# Patient Record
Sex: FEMALE | Race: WHITE | NOT HISPANIC OR LATINO | Employment: FULL TIME | URBAN - METROPOLITAN AREA
[De-identification: names, ages, dates, MRNs, and addresses within clinical notes are randomized per-mention and may not be internally consistent; named-entity substitution may affect disease eponyms.]

---

## 2022-07-23 ENCOUNTER — OFFICE VISIT (OUTPATIENT)
Dept: URGENT CARE | Facility: CLINIC | Age: 27
End: 2022-07-23
Payer: COMMERCIAL

## 2022-07-23 VITALS
OXYGEN SATURATION: 100 % | RESPIRATION RATE: 16 BRPM | BODY MASS INDEX: 19.93 KG/M2 | DIASTOLIC BLOOD PRESSURE: 78 MMHG | HEIGHT: 67 IN | TEMPERATURE: 98 F | SYSTOLIC BLOOD PRESSURE: 136 MMHG | HEART RATE: 73 BPM | WEIGHT: 127 LBS

## 2022-07-23 DIAGNOSIS — R10.9 ABDOMINAL PAIN, UNSPECIFIED ABDOMINAL LOCATION: Primary | ICD-10-CM

## 2022-07-23 DIAGNOSIS — M25.511 ACUTE PAIN OF RIGHT SHOULDER: ICD-10-CM

## 2022-07-23 DIAGNOSIS — R19.7 DIARRHEA, UNSPECIFIED TYPE: ICD-10-CM

## 2022-07-23 DIAGNOSIS — T14.8XXA MUSCLE STRAIN: ICD-10-CM

## 2022-07-23 LAB
B-HCG UR QL: NEGATIVE
BILIRUB UR QL STRIP: NEGATIVE
CTP QC/QA: YES
GLUCOSE UR QL STRIP: NEGATIVE
KETONES UR QL STRIP: NEGATIVE
LEUKOCYTE ESTERASE UR QL STRIP: NEGATIVE
PH, POC UA: 5 (ref 5–8)
POC BLOOD, URINE: NEGATIVE
POC NITRATES, URINE: NEGATIVE
PROT UR QL STRIP: POSITIVE
SP GR UR STRIP: 1 (ref 1–1.03)
UROBILINOGEN UR STRIP-ACNC: NORMAL (ref 0.1–1.1)

## 2022-07-23 PROCEDURE — 81003 POCT URINALYSIS, DIPSTICK, AUTOMATED, W/O SCOPE: ICD-10-PCS | Mod: QW,S$GLB,, | Performed by: FAMILY MEDICINE

## 2022-07-23 PROCEDURE — 3078F PR MOST RECENT DIASTOLIC BLOOD PRESSURE < 80 MM HG: ICD-10-PCS | Mod: CPTII,S$GLB,, | Performed by: FAMILY MEDICINE

## 2022-07-23 PROCEDURE — 3008F BODY MASS INDEX DOCD: CPT | Mod: CPTII,S$GLB,, | Performed by: FAMILY MEDICINE

## 2022-07-23 PROCEDURE — 81025 URINE PREGNANCY TEST: CPT | Mod: S$GLB,,, | Performed by: FAMILY MEDICINE

## 2022-07-23 PROCEDURE — 3078F DIAST BP <80 MM HG: CPT | Mod: CPTII,S$GLB,, | Performed by: FAMILY MEDICINE

## 2022-07-23 PROCEDURE — 3008F PR BODY MASS INDEX (BMI) DOCUMENTED: ICD-10-PCS | Mod: CPTII,S$GLB,, | Performed by: FAMILY MEDICINE

## 2022-07-23 PROCEDURE — 3075F SYST BP GE 130 - 139MM HG: CPT | Mod: CPTII,S$GLB,, | Performed by: FAMILY MEDICINE

## 2022-07-23 PROCEDURE — 74019 RADEX ABDOMEN 2 VIEWS: CPT | Mod: FY,S$GLB,, | Performed by: INTERNAL MEDICINE

## 2022-07-23 PROCEDURE — 99214 OFFICE O/P EST MOD 30 MIN: CPT | Mod: S$GLB,,, | Performed by: FAMILY MEDICINE

## 2022-07-23 PROCEDURE — 3075F PR MOST RECENT SYSTOLIC BLOOD PRESS GE 130-139MM HG: ICD-10-PCS | Mod: CPTII,S$GLB,, | Performed by: FAMILY MEDICINE

## 2022-07-23 PROCEDURE — 81003 URINALYSIS AUTO W/O SCOPE: CPT | Mod: QW,S$GLB,, | Performed by: FAMILY MEDICINE

## 2022-07-23 PROCEDURE — 1159F PR MEDICATION LIST DOCUMENTED IN MEDICAL RECORD: ICD-10-PCS | Mod: CPTII,S$GLB,, | Performed by: FAMILY MEDICINE

## 2022-07-23 PROCEDURE — 74019 XR ABDOMEN FLAT AND ERECT: ICD-10-PCS | Mod: FY,S$GLB,, | Performed by: INTERNAL MEDICINE

## 2022-07-23 PROCEDURE — 81025 POCT URINE PREGNANCY: ICD-10-PCS | Mod: S$GLB,,, | Performed by: FAMILY MEDICINE

## 2022-07-23 PROCEDURE — 1160F RVW MEDS BY RX/DR IN RCRD: CPT | Mod: CPTII,S$GLB,, | Performed by: FAMILY MEDICINE

## 2022-07-23 PROCEDURE — 1159F MED LIST DOCD IN RCRD: CPT | Mod: CPTII,S$GLB,, | Performed by: FAMILY MEDICINE

## 2022-07-23 PROCEDURE — 99214 PR OFFICE/OUTPT VISIT, EST, LEVL IV, 30-39 MIN: ICD-10-PCS | Mod: S$GLB,,, | Performed by: FAMILY MEDICINE

## 2022-07-23 PROCEDURE — 1160F PR REVIEW ALL MEDS BY PRESCRIBER/CLIN PHARMACIST DOCUMENTED: ICD-10-PCS | Mod: CPTII,S$GLB,, | Performed by: FAMILY MEDICINE

## 2022-07-23 RX ORDER — DICYCLOMINE HYDROCHLORIDE 20 MG/1
20 TABLET ORAL EVERY 6 HOURS
Qty: 20 TABLET | Refills: 0 | Status: SHIPPED | OUTPATIENT
Start: 2022-07-23 | End: 2022-07-28

## 2022-07-23 NOTE — PROGRESS NOTES
"Subjective:       Patient ID: Magnolia Shen is a 27 y.o. female.    Vitals:  height is 5' 7" (1.702 m) and weight is 57.6 kg (127 lb). Her temperature is 98.3 °F (36.8 °C). Her blood pressure is 136/78 and her pulse is 73. Her respiration is 16 and oxygen saturation is 100%.     Chief Complaint: Abdominal Pain    Patient is experiencing pain in the abdomen, mostly on the right side under rib cage and around the right shoulder area.  She states that the pain started last night and has presisted. States the pain is mild to moderate, located to RUQ, no radiation. Also reports mild muscle strain around R shulder and R side of neck since yesterday.  Denies CP, SOB, N/V, fever, chills, cough. HA, body aches, dysuria.   Patient has taken tylenol for relief.     Abdominal Pain  This is a new problem. The current episode started yesterday. The onset quality is sudden. The problem occurs constantly. The pain is located in the RUQ. The pain is at a severity of 5/10. The pain is mild. The quality of the pain is sharp. The abdominal pain radiates to the right shoulder. Associated symptoms include diarrhea. The pain is aggravated by deep breathing. The pain is relieved by sitting up and being still. She has tried acetaminophen for the symptoms. The treatment provided no relief.       Gastrointestinal: Positive for abdominal pain and diarrhea.       Objective:      Physical Exam   Constitutional: She is oriented to person, place, and time. She appears well-developed. She is cooperative.  Non-toxic appearance. She does not appear ill. No distress. normal  HENT:   Head: Normocephalic and atraumatic.   Ears:   Right Ear: Hearing, tympanic membrane, external ear and ear canal normal. impacted cerumen  Left Ear: Hearing, tympanic membrane, external ear and ear canal normal. impacted cerumen  Nose: Nose normal. No mucosal edema, rhinorrhea, nasal deformity or congestion. No epistaxis. Right sinus exhibits no maxillary sinus tenderness and " no frontal sinus tenderness. Left sinus exhibits no maxillary sinus tenderness and no frontal sinus tenderness.   Mouth/Throat: Uvula is midline, oropharynx is clear and moist and mucous membranes are normal. No trismus in the jaw. Normal dentition. No uvula swelling.   Eyes: Conjunctivae and lids are normal. Right eye exhibits no discharge. Left eye exhibits no discharge. No scleral icterus.   Neck: Trachea normal and phonation normal. Neck supple.   Cardiovascular: Normal rate, regular rhythm, normal heart sounds and normal pulses.   No murmur heard.  Pulmonary/Chest: Effort normal and breath sounds normal. No stridor. No respiratory distress. She has no wheezes. She has no rhonchi. She has no rales. She exhibits no tenderness.   Abdominal: Normal appearance and bowel sounds are normal. She exhibits no distension and no mass. Soft. flat abdomen There is abdominal tenderness. There is no left CVA tenderness and no right CVA tenderness.      Comments: +v tenderness to RUQ. No umbilical or RLQ tenderness. BS +ve   Musculoskeletal: Normal range of motion.         General: No deformity. Normal range of motion.      Cervical back: She exhibits no tenderness.   Lymphadenopathy:     She has no cervical adenopathy.   Neurological: She is alert and oriented to person, place, and time. She exhibits normal muscle tone. Coordination normal.   Skin: Skin is warm, dry, intact, not diaphoretic and not pale.   Psychiatric: Her speech is normal and behavior is normal. Judgment and thought content normal.   Nursing note and vitals reviewed.        Assessment:       1. Abdominal pain, unspecified abdominal location    2. Diarrhea, unspecified type    3. Acute pain of right shoulder    4. Muscle strain          Plan:     UA and Abd Xray are unremarkable. Cindering that pt is having mild symptoms, I feel comfortable to DC pt home. Will f/i9 US report.  Discussed results/diagnosis/plan with patient in clinic. Advised to f/u with PCP  within 2-5 days. ER precautions given if symptoms get any worse. All questions answered. Patient verbally understood and agreed with treatment plan.    Abdominal pain, unspecified abdominal location  -     Cancel: POCT COVID-19 Rapid Screening  -     POCT Urinalysis, Dipstick, Automated, W/O Scope  -     POCT urine pregnancy  -     X-Ray Abdomen Flat And Erect; Future; Expected date: 07/23/2022  -     US Abdomen Complete; Future; Expected date: 07/23/2022  -     Ambulatory referral/consult to Family Practice    Diarrhea, unspecified type  -     Ambulatory referral/consult to Family Practice    Acute pain of right shoulder    Muscle strain    Other orders  -     dicyclomine (BENTYL) 20 mg tablet; Take 1 tablet (20 mg total) by mouth every 6 (six) hours. for 5 days  Dispense: 20 tablet; Refill: 0

## 2022-10-17 ENCOUNTER — LAB VISIT (OUTPATIENT)
Dept: LAB | Facility: HOSPITAL | Age: 27
End: 2022-10-17
Attending: INTERNAL MEDICINE
Payer: COMMERCIAL

## 2022-10-17 ENCOUNTER — OFFICE VISIT (OUTPATIENT)
Dept: INTERNAL MEDICINE | Facility: CLINIC | Age: 27
End: 2022-10-17
Payer: COMMERCIAL

## 2022-10-17 ENCOUNTER — TELEPHONE (OUTPATIENT)
Dept: INTERNAL MEDICINE | Facility: CLINIC | Age: 27
End: 2022-10-17

## 2022-10-17 VITALS
SYSTOLIC BLOOD PRESSURE: 118 MMHG | TEMPERATURE: 98 F | OXYGEN SATURATION: 99 % | HEART RATE: 71 BPM | HEIGHT: 67 IN | WEIGHT: 128.75 LBS | DIASTOLIC BLOOD PRESSURE: 66 MMHG | BODY MASS INDEX: 20.21 KG/M2

## 2022-10-17 DIAGNOSIS — Z11.59 ENCOUNTER FOR HEPATITIS C SCREENING TEST FOR LOW RISK PATIENT: ICD-10-CM

## 2022-10-17 DIAGNOSIS — Z00.00 WELLNESS EXAMINATION: ICD-10-CM

## 2022-10-17 DIAGNOSIS — Z00.00 WELLNESS EXAMINATION: Primary | ICD-10-CM

## 2022-10-17 LAB
ALBUMIN SERPL BCP-MCNC: 4 G/DL (ref 3.5–5.2)
ALP SERPL-CCNC: 95 U/L (ref 55–135)
ALT SERPL W/O P-5'-P-CCNC: 16 U/L (ref 10–44)
ANION GAP SERPL CALC-SCNC: 13 MMOL/L (ref 8–16)
AST SERPL-CCNC: 19 U/L (ref 10–40)
BILIRUB SERPL-MCNC: 0.4 MG/DL (ref 0.1–1)
BUN SERPL-MCNC: 18 MG/DL (ref 6–20)
CALCIUM SERPL-MCNC: 10.2 MG/DL (ref 8.7–10.5)
CHLORIDE SERPL-SCNC: 106 MMOL/L (ref 95–110)
CHOLEST SERPL-MCNC: 137 MG/DL (ref 120–199)
CHOLEST/HDLC SERPL: 3 {RATIO} (ref 2–5)
CO2 SERPL-SCNC: 20 MMOL/L (ref 23–29)
CREAT SERPL-MCNC: 0.8 MG/DL (ref 0.5–1.4)
ERYTHROCYTE [DISTWIDTH] IN BLOOD BY AUTOMATED COUNT: 12.3 % (ref 11.5–14.5)
EST. GFR  (NO RACE VARIABLE): >60 ML/MIN/1.73 M^2
GLUCOSE SERPL-MCNC: 88 MG/DL (ref 70–110)
HCT VFR BLD AUTO: 43.1 % (ref 37–48.5)
HCV AB SERPL QL IA: NORMAL
HDLC SERPL-MCNC: 45 MG/DL (ref 40–75)
HDLC SERPL: 32.8 % (ref 20–50)
HGB BLD-MCNC: 14.5 G/DL (ref 12–16)
LDLC SERPL CALC-MCNC: 78.6 MG/DL (ref 63–159)
MCH RBC QN AUTO: 28.5 PG (ref 27–31)
MCHC RBC AUTO-ENTMCNC: 33.6 G/DL (ref 32–36)
MCV RBC AUTO: 85 FL (ref 82–98)
NONHDLC SERPL-MCNC: 92 MG/DL
PLATELET # BLD AUTO: 279 K/UL (ref 150–450)
PMV BLD AUTO: 10 FL (ref 9.2–12.9)
POTASSIUM SERPL-SCNC: 5.1 MMOL/L (ref 3.5–5.1)
PROT SERPL-MCNC: 7.5 G/DL (ref 6–8.4)
RBC # BLD AUTO: 5.09 M/UL (ref 4–5.4)
SODIUM SERPL-SCNC: 139 MMOL/L (ref 136–145)
T4 FREE SERPL-MCNC: 1.57 NG/DL (ref 0.71–1.51)
TRIGL SERPL-MCNC: 67 MG/DL (ref 30–150)
TSH SERPL DL<=0.005 MIU/L-ACNC: <0.01 UIU/ML (ref 0.4–4)
WBC # BLD AUTO: 6.38 K/UL (ref 3.9–12.7)

## 2022-10-17 PROCEDURE — 1160F PR REVIEW ALL MEDS BY PRESCRIBER/CLIN PHARMACIST DOCUMENTED: ICD-10-PCS | Mod: CPTII,S$GLB,, | Performed by: INTERNAL MEDICINE

## 2022-10-17 PROCEDURE — 3078F PR MOST RECENT DIASTOLIC BLOOD PRESSURE < 80 MM HG: ICD-10-PCS | Mod: CPTII,S$GLB,, | Performed by: INTERNAL MEDICINE

## 2022-10-17 PROCEDURE — 1159F PR MEDICATION LIST DOCUMENTED IN MEDICAL RECORD: ICD-10-PCS | Mod: CPTII,S$GLB,, | Performed by: INTERNAL MEDICINE

## 2022-10-17 PROCEDURE — 84439 ASSAY OF FREE THYROXINE: CPT | Performed by: INTERNAL MEDICINE

## 2022-10-17 PROCEDURE — 99385 PR PREVENTIVE VISIT,NEW,18-39: ICD-10-PCS | Mod: S$GLB,,, | Performed by: INTERNAL MEDICINE

## 2022-10-17 PROCEDURE — 99999 PR PBB SHADOW E&M-EST. PATIENT-LVL IV: CPT | Mod: PBBFAC,,, | Performed by: INTERNAL MEDICINE

## 2022-10-17 PROCEDURE — 3078F DIAST BP <80 MM HG: CPT | Mod: CPTII,S$GLB,, | Performed by: INTERNAL MEDICINE

## 2022-10-17 PROCEDURE — 1160F RVW MEDS BY RX/DR IN RCRD: CPT | Mod: CPTII,S$GLB,, | Performed by: INTERNAL MEDICINE

## 2022-10-17 PROCEDURE — 99385 PREV VISIT NEW AGE 18-39: CPT | Mod: S$GLB,,, | Performed by: INTERNAL MEDICINE

## 2022-10-17 PROCEDURE — 3074F SYST BP LT 130 MM HG: CPT | Mod: CPTII,S$GLB,, | Performed by: INTERNAL MEDICINE

## 2022-10-17 PROCEDURE — 86803 HEPATITIS C AB TEST: CPT | Performed by: INTERNAL MEDICINE

## 2022-10-17 PROCEDURE — 1159F MED LIST DOCD IN RCRD: CPT | Mod: CPTII,S$GLB,, | Performed by: INTERNAL MEDICINE

## 2022-10-17 PROCEDURE — 36415 COLL VENOUS BLD VENIPUNCTURE: CPT | Performed by: INTERNAL MEDICINE

## 2022-10-17 PROCEDURE — 80053 COMPREHEN METABOLIC PANEL: CPT | Performed by: INTERNAL MEDICINE

## 2022-10-17 PROCEDURE — 99999 PR PBB SHADOW E&M-EST. PATIENT-LVL IV: ICD-10-PCS | Mod: PBBFAC,,, | Performed by: INTERNAL MEDICINE

## 2022-10-17 PROCEDURE — 85027 COMPLETE CBC AUTOMATED: CPT | Performed by: INTERNAL MEDICINE

## 2022-10-17 PROCEDURE — 84443 ASSAY THYROID STIM HORMONE: CPT | Performed by: INTERNAL MEDICINE

## 2022-10-17 PROCEDURE — 3074F PR MOST RECENT SYSTOLIC BLOOD PRESSURE < 130 MM HG: ICD-10-PCS | Mod: CPTII,S$GLB,, | Performed by: INTERNAL MEDICINE

## 2022-10-17 PROCEDURE — 80061 LIPID PANEL: CPT | Performed by: INTERNAL MEDICINE

## 2022-10-17 NOTE — TELEPHONE ENCOUNTER
----- Message from Renee Bal MD sent at 10/17/2022  9:50 AM CDT -----  Reina Burch at [facility] in [facility]

## 2022-10-17 NOTE — PROGRESS NOTES
[facility] Note    Chief Complaint    No chief complaint on file.    History of Present Illness      Magnolia Shen is a 27 y.o. female who presents today for chief complaint annual wellness and est care .     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  No significant fam medical hx, no surgeries, no allergies     Mood is ok, sleep is ok   Watches diet, she exercises       There are no problems to display for this patient.      Health Maintenance   Topic Date Due    Hepatitis C Screening  Never done    Lipid Panel  Never done    TETANUS VACCINE  Never done    Pap Smear  Never done       History reviewed. No pertinent past medical history.    History reviewed. No pertinent surgical history.    family history is not on file.    Social History     Tobacco Use    Smoking status: Never    Smokeless tobacco: Never   Substance Use Topics    Alcohol use: Yes     Alcohol/week: 1.0 standard drink     Types: 1 Glasses of wine per week    Drug use: Never       Review of Systems   Constitutional:  Negative for chills and fever.   HENT:  Negative for congestion and sore throat.    Eyes:  Negative for blurred vision and discharge.   Respiratory:  Negative for cough and shortness of breath.    Cardiovascular:  Negative for chest pain and palpitations.   Gastrointestinal:  Negative for constipation, diarrhea, nausea and vomiting.   Genitourinary:  Negative for dysuria and hematuria.   Musculoskeletal:  Negative  "for falls and myalgias.   Skin:  Negative for itching and rash.   Neurological:  Negative for dizziness and headaches.      Outpatient Encounter Medications as of 10/17/2022   Medication Sig Dispense Refill    norethindrone-e.estradioL-iron (LO LOESTRIN FE) 1 mg-10 mcg (24)/10 mcg (2) Tab Take 1 tablet by mouth daily 84 tablet 3     No facility-administered encounter medications on file as of 10/17/2022.        Review of patient's allergies indicates:  No Known Allergies        Physical Exam      Vital Signs  Temp: 98.1 °F (36.7 °C)  Pulse: 71  SpO2: 99 %  BP: 118/66  BP Location: Right arm  Patient Position: Sitting  Height and Weight  Height: 5' 7" (170.2 cm)  Weight: 58.4 kg (128 lb 12 oz)  BSA (Calculated - sq m): 1.66 sq meters  BMI (Calculated): 20.2  Weight in (lb) to have BMI = 25: 159.3]    Physical Exam  Vitals reviewed.   Constitutional:       Appearance: She is well-developed.   HENT:      Head: Normocephalic and atraumatic.      Right Ear: External ear normal.      Left Ear: External ear normal.   Eyes:      General:         Right eye: No discharge.         Left eye: No discharge.   Neck:      Thyroid: No thyromegaly.   Cardiovascular:      Rate and Rhythm: Normal rate and regular rhythm.      Heart sounds: Normal heart sounds. No murmur heard.  Pulmonary:      Effort: Pulmonary effort is normal. No respiratory distress.      Breath sounds: Normal breath sounds.   Abdominal:      General: Bowel sounds are normal. There is no distension.      Palpations: Abdomen is soft.      Tenderness: There is no abdominal tenderness.   Musculoskeletal:         General: No deformity. Normal range of motion.      Cervical back: Normal range of motion.   Skin:     General: Skin is warm and dry.      Findings: No rash.   Neurological:      Mental Status: She is alert and oriented to person, place, and time.   Psychiatric:         Behavior: Behavior normal.        Laboratory:  CBC:  No results for input(s): WBC, RBC, HGB, " HCT, PLT, MCV, MCH, MCHC in the last 2160 hours.  CMP:  No results for input(s): GLU, CALCIUM, ALBUMIN, PROT, NA, K, CO2, CL, BUN, ALKPHOS, ALT, AST, BILITOT in the last 2160 hours.    Invalid input(s): CREATININ  URINALYSIS:  Recent Labs   Lab Result Units 07/23/22  1033   pH, UA  5.0      LIPIDS:  No results for input(s): TSH, HDL, CHOL, TRIG, LDLCALC, CHOLHDL, NONHDLCHOL, TOTALCHOLEST in the last 2160 hours.  TSH:  No results for input(s): TSH in the last 2160 hours.  A1C:  No results for input(s): HGBA1C in the last 2160 hours.        Assessment/Plan     Magnolia Shen is a 27 y.o.female with:    1. Wellness examination  -     Ambulatory referral/consult to Obstetrics / Gynecology; Future; Expected date: 10/24/2022  -     Ambulatory referral/consult to Dermatology; Future; Expected date: 10/24/2022  -     Lipid Panel; Future; Expected date: 10/17/2022  -     Comprehensive Metabolic Panel; Future; Expected date: 10/17/2022  -     T4, Free; Future; Expected date: 10/17/2022  -     TSH; Future; Expected date: 10/17/2022  -     CBC Without Differential; Future; Expected date: 10/17/2022    2. Encounter for hepatitis C screening test for low risk patient  -     Hepatitis C Antibody; Future; Expected date: 10/17/2022       Use of the [facility] Patient Portal discussed and encouraged during today's visit  -Continue current medications and maintain follow up with specialists.  Return to clinic in 12 months .  No future appointments.    Renee Bal MD  10/17/2022 9:44 AM    Primary Care Internal Medicine

## 2022-10-17 NOTE — PATIENT INSTRUCTIONS
[email or web address]  [phone]  Dr Waller or [facility] on [address]  Dr Stacey Barakat [phone]

## 2022-10-17 NOTE — LETTER
AUTHORIZATION FOR RELEASE OF   CONFIDENTIAL INFORMATION    Dear Dr. Burch,    We are seeing Magnolia Shen, date of birth 1995, in the clinic at [facility]. Renee Bal MD is the patient's PCP. Magnolia Shen has an outstanding lab/procedure at the time we reviewed her chart. In order to help keep her health information updated, she has authorized us to request the following medical record(s):        (  )  MAMMOGRAM                                      (  )  COLONOSCOPY      (X)  PAP SMEAR                                          (  )  OUTSIDE LAB RESULTS     (  )  DEXA SCAN                                          (  )  EYE EXAM            (  )  FOOT EXAM                                          (  )  ENTIRE RECORD     (  )  OUTSIDE IMMUNIZATIONS                 (  )  _______________         Please fax records to [facility], Abby E. Gandolfi, MD, [phone]     If you have any questions, please contact Katherine at [phone].           Patient Name: Magnolia Shen  : 1995  Patient Phone #: [phone]

## 2022-10-23 DIAGNOSIS — E05.90 HYPERTHYROIDISM: Primary | ICD-10-CM

## 2022-11-01 ENCOUNTER — PATIENT OUTREACH (OUTPATIENT)
Dept: ADMINISTRATIVE | Facility: HOSPITAL | Age: 27
End: 2022-11-01
Payer: COMMERCIAL

## 2022-11-01 NOTE — PROGRESS NOTES
Health Maintenance Due   Topic Date Due    COVID-19 Vaccine (1) Never done    HIV Screening  Never done    TETANUS VACCINE  Never done    Pap Smear  Never done    Influenza Vaccine (1) Never done     Chart review done. HM updated. Immunizations reviewed & updated. Care Everywhere updated.  OB/GYN notes from 2021 scanned into .

## 2022-11-15 ENCOUNTER — LAB VISIT (OUTPATIENT)
Dept: LAB | Facility: HOSPITAL | Age: 27
End: 2022-11-15
Attending: INTERNAL MEDICINE
Payer: COMMERCIAL

## 2022-11-15 DIAGNOSIS — E05.90 HYPERTHYROIDISM: ICD-10-CM

## 2022-11-15 LAB
T3 SERPL-MCNC: 239 NG/DL (ref 60–180)
T4 FREE SERPL-MCNC: 1.67 NG/DL (ref 0.71–1.51)
TSH SERPL DL<=0.005 MIU/L-ACNC: <0.01 UIU/ML (ref 0.4–4)

## 2022-11-15 PROCEDURE — 84480 ASSAY TRIIODOTHYRONINE (T3): CPT | Performed by: INTERNAL MEDICINE

## 2022-11-15 PROCEDURE — 84439 ASSAY OF FREE THYROXINE: CPT | Performed by: INTERNAL MEDICINE

## 2022-11-15 PROCEDURE — 36415 COLL VENOUS BLD VENIPUNCTURE: CPT | Mod: PO | Performed by: INTERNAL MEDICINE

## 2022-11-15 PROCEDURE — 84443 ASSAY THYROID STIM HORMONE: CPT | Performed by: INTERNAL MEDICINE

## 2022-11-17 ENCOUNTER — OFFICE VISIT (OUTPATIENT)
Dept: INTERNAL MEDICINE | Facility: CLINIC | Age: 27
End: 2022-11-17
Payer: COMMERCIAL

## 2022-11-17 VITALS
SYSTOLIC BLOOD PRESSURE: 122 MMHG | WEIGHT: 127.88 LBS | DIASTOLIC BLOOD PRESSURE: 68 MMHG | HEIGHT: 67 IN | BODY MASS INDEX: 20.07 KG/M2 | HEART RATE: 91 BPM | TEMPERATURE: 98 F | OXYGEN SATURATION: 98 %

## 2022-11-17 DIAGNOSIS — B34.1 COXSACKIE VIRUSES: ICD-10-CM

## 2022-11-17 DIAGNOSIS — E05.90 HYPERTHYROIDISM: Primary | ICD-10-CM

## 2022-11-17 PROCEDURE — 99214 PR OFFICE/OUTPT VISIT, EST, LEVL IV, 30-39 MIN: ICD-10-PCS | Mod: S$GLB,,, | Performed by: INTERNAL MEDICINE

## 2022-11-17 PROCEDURE — 3078F PR MOST RECENT DIASTOLIC BLOOD PRESSURE < 80 MM HG: ICD-10-PCS | Mod: CPTII,S$GLB,, | Performed by: INTERNAL MEDICINE

## 2022-11-17 PROCEDURE — 1159F PR MEDICATION LIST DOCUMENTED IN MEDICAL RECORD: ICD-10-PCS | Mod: CPTII,S$GLB,, | Performed by: INTERNAL MEDICINE

## 2022-11-17 PROCEDURE — 99999 PR PBB SHADOW E&M-EST. PATIENT-LVL III: CPT | Mod: PBBFAC,,, | Performed by: INTERNAL MEDICINE

## 2022-11-17 PROCEDURE — 3074F PR MOST RECENT SYSTOLIC BLOOD PRESSURE < 130 MM HG: ICD-10-PCS | Mod: CPTII,S$GLB,, | Performed by: INTERNAL MEDICINE

## 2022-11-17 PROCEDURE — 1159F MED LIST DOCD IN RCRD: CPT | Mod: CPTII,S$GLB,, | Performed by: INTERNAL MEDICINE

## 2022-11-17 PROCEDURE — 3008F PR BODY MASS INDEX (BMI) DOCUMENTED: ICD-10-PCS | Mod: CPTII,S$GLB,, | Performed by: INTERNAL MEDICINE

## 2022-11-17 PROCEDURE — 3074F SYST BP LT 130 MM HG: CPT | Mod: CPTII,S$GLB,, | Performed by: INTERNAL MEDICINE

## 2022-11-17 PROCEDURE — 99999 PR PBB SHADOW E&M-EST. PATIENT-LVL III: ICD-10-PCS | Mod: PBBFAC,,, | Performed by: INTERNAL MEDICINE

## 2022-11-17 PROCEDURE — 99214 OFFICE O/P EST MOD 30 MIN: CPT | Mod: S$GLB,,, | Performed by: INTERNAL MEDICINE

## 2022-11-17 PROCEDURE — 3078F DIAST BP <80 MM HG: CPT | Mod: CPTII,S$GLB,, | Performed by: INTERNAL MEDICINE

## 2022-11-17 PROCEDURE — 3008F BODY MASS INDEX DOCD: CPT | Mod: CPTII,S$GLB,, | Performed by: INTERNAL MEDICINE

## 2022-11-17 NOTE — PROGRESS NOTES
[facility]    Chief Complaint      Chief Complaint   Patient presents with    Follow-up     Discuss labs    Thyroid Problem    Mouth Lesions     Kids had HFM about a month ago and she developed a bunch ulcers that went ended up going away but now they have returned      History of Present Illness      Magnolia Shen is a 27 y.o. female who presents today for chief complaint hyperthyroid       HPI   Follow up labs - TSH <0.01 x2 and T4 1.6  Recalls that she was hyperthyroid pre conception(prior to Dec 2020), was placed on clomid and apparently not rechecked,  unsure what first timester TSH was. Twins born 8.2021  Euthyroid clinically, denies palpitations, hair loss, dry skin, diarrhea, fatigue  Has fam hx of hypothyroidism       Kids had HFM she has oral ulcers   There are no problems to display for this patient.      Health Maintenance   Topic Date Due    TETANUS VACCINE  Never done    Pap Smear  Never done    Hepatitis C Screening  Completed    Lipid Panel  Completed       History reviewed. No pertinent past medical history.    History reviewed. No pertinent surgical history.    family history is not on file.    Social History     Tobacco Use    Smoking status: Never    Smokeless tobacco: Never   Substance Use Topics    Alcohol use: Yes     Alcohol/week: 1.0 standard drink     Types: 1 Glasses of wine per week    Drug use: Never       Review of Systems   Constitutional:  Negative for chills, fever, malaise/fatigue and weight loss.   Respiratory:  Negative for cough, sputum production, shortness of breath and wheezing.    Cardiovascular:  Negative for chest pain, palpitations, orthopnea and leg swelling.   Gastrointestinal:  Negative for constipation, diarrhea, nausea and vomiting.   Genitourinary:  Negative for dysuria, frequency, hematuria and urgency.      Outpatient Encounter Medications as of 11/17/2022   Medication Sig Note Dispense Refill    norethindrone-e.estradioL-iron (LO  "LOESTRIN FE) 1 mg-10 mcg (24)/10 mcg (2) Tab Take 1 tablet by mouth daily  84 tablet 3    [DISCONTINUED] diphenhydrAMINE-aluminum-magnesium hydroxide-simethicone-LIDOcaine HCl 2% Swish and spit 15 mLs every 4 (four) hours as needed (mouth pain). 11/17/2022: refer to rx [id number] 200 each 0     No facility-administered encounter medications on file as of 11/17/2022.        Review of patient's allergies indicates:  No Known Allergies        Physical Exam      Vital Signs  Temp: 97.7 °F (36.5 °C)  Pulse: 91  SpO2: 98 %  BP: 122/68  BP Location: Right arm  Patient Position: Sitting  Height and Weight  Height: 5' 7" (170.2 cm)  Weight: 58 kg (127 lb 13.9 oz)  BSA (Calculated - sq m): 1.66 sq meters  BMI (Calculated): 20  Weight in (lb) to have BMI = 25: 159.3]    Physical Exam  Vitals reviewed.   Constitutional:       General: She is not in acute distress.     Appearance: She is well-developed. She is not diaphoretic.   HENT:      Head: Normocephalic and atraumatic.      Right Ear: External ear normal.      Left Ear: External ear normal.      Nose: Nose normal.   Eyes:      General:         Right eye: No discharge.         Left eye: No discharge.      Conjunctiva/sclera: Conjunctivae normal.   Pulmonary:      Effort: Pulmonary effort is normal. No respiratory distress.   Musculoskeletal:         General: Normal range of motion.      Cervical back: Normal range of motion.   Skin:     Coloration: Skin is not pale.      Findings: No rash.   Neurological:      Mental Status: She is alert and oriented to person, place, and time.   Psychiatric:         Behavior: Behavior normal.         Thought Content: Thought content normal.        Laboratory:  CBC:  Recent Labs   Lab Result Units 10/17/22  1015   WBC K/uL 6.38   RBC M/uL 5.09   Hemoglobin g/dL 14.5   Hematocrit % 43.1   Platelets K/uL 279   MCV fL 85   MCH pg 28.5   MCHC g/dL 33.6     CMP:  Recent Labs   Lab Result Units 10/17/22  1015   Glucose mg/dL 88   Calcium mg/dL 10.2 "   Albumin g/dL 4.0   Total Protein g/dL 7.5   Sodium mmol/L 139   Potassium mmol/L 5.1   CO2 mmol/L 20*   Chloride mmol/L 106   BUN mg/dL 18   Alkaline Phosphatase U/L 95   ALT U/L 16   AST U/L 19   Total Bilirubin mg/dL 0.4     URINALYSIS:  No results for input(s): COLORU, CLARITYU, SPECGRAV, PHUR, PROTEINUA, GLUCOSEU, BILIRUBINCON, BLOODU, WBCU, RBCU, BACTERIA, MUCUS, NITRITE, LEUKOCYTESUR, UROBILINOGEN, HYALINECASTS in the last 2160 hours.   LIPIDS:  Recent Labs   Lab Result Units 10/17/22  1015 11/15/22  0715   TSH uIU/mL <0.010* <0.010*   HDL mg/dL 45  --    Cholesterol mg/dL 137  --    Triglycerides mg/dL 67  --    LDL Cholesterol mg/dL 78.6  --    HDL/Cholesterol Ratio % 32.8  --    Non-HDL Cholesterol mg/dL 92  --    Total Cholesterol/HDL Ratio  3.0  --      TSH:  Recent Labs   Lab Result Units 10/17/22  1015 11/15/22  0715   TSH uIU/mL <0.010* <0.010*     A1C:  No results for input(s): HGBA1C in the last 2160 hours.        Assessment/Plan     Magnolia Shen is a 27 y.o.female with:    1. Hyperthyroidism  Initiate work up and will do econsult to discuss MMI with Endo  Discussed teratogenicity     -     TSH; Future; Expected date: 11/17/2022  -     T3; Future; Expected date: 11/17/2022  -     US Soft Tissue Head Neck Thyroid; Future; Expected date: 11/17/2022  -     THYROID STIMULATING IMMUNOGLOBULIN; Future; Expected date: 11/17/2022  -     THYROTROPIN RECEPTOR ANTIBODY; Future; Expected date: 11/17/2022  -     T4, FREE; Future; Expected date: 11/17/2022    2. Coxsackie viruses  -     Discontinue: diphenhydrAMINE-aluminum-magnesium hydroxide-simethicone-LIDOcaine HCl 2%; Swish and spit 15 mLs every 4 (four) hours as needed (mouth pain).  Dispense: 200 each; Refill: 0         Future Appointments   Date Time Provider Department Center   12/1/2022 10:00 AM [facility] [place]   1/31/2023  2:10 PM Emily Yarbrough MD [facility] [place]       Renee Bal MD  11/17/2022 8:54  AM    Primary Care Internal Medicine

## 2022-12-01 ENCOUNTER — OFFICE VISIT (OUTPATIENT)
Dept: OBSTETRICS AND GYNECOLOGY | Facility: CLINIC | Age: 27
End: 2022-12-01
Payer: COMMERCIAL

## 2022-12-01 ENCOUNTER — HOSPITAL ENCOUNTER (OUTPATIENT)
Dept: RADIOLOGY | Facility: HOSPITAL | Age: 27
Discharge: HOME | End: 2022-12-01
Attending: INTERNAL MEDICINE
Payer: COMMERCIAL

## 2022-12-01 VITALS
HEIGHT: 67 IN | DIASTOLIC BLOOD PRESSURE: 72 MMHG | BODY MASS INDEX: 19.82 KG/M2 | WEIGHT: 126.31 LBS | SYSTOLIC BLOOD PRESSURE: 118 MMHG

## 2022-12-01 DIAGNOSIS — Z00.00 WELLNESS EXAMINATION: ICD-10-CM

## 2022-12-01 DIAGNOSIS — E05.90 HYPERTHYROIDISM: ICD-10-CM

## 2022-12-01 DIAGNOSIS — Z01.419 WOMEN'S ANNUAL ROUTINE GYNECOLOGICAL EXAMINATION: Primary | ICD-10-CM

## 2022-12-01 DIAGNOSIS — Z12.4 ENCOUNTER FOR PAPANICOLAOU SMEAR FOR CERVICAL CANCER SCREENING: ICD-10-CM

## 2022-12-01 PROCEDURE — 76536 US EXAM OF HEAD AND NECK: CPT | Mod: TC

## 2022-12-01 PROCEDURE — 76536 US EXAM OF HEAD AND NECK: CPT | Mod: 26,,, | Performed by: RADIOLOGY

## 2022-12-01 PROCEDURE — 99385 PR PREVENTIVE VISIT,NEW,18-39: ICD-10-PCS | Mod: S$GLB,,, | Performed by: NURSE PRACTITIONER

## 2022-12-01 PROCEDURE — 99385 PREV VISIT NEW AGE 18-39: CPT | Mod: S$GLB,,, | Performed by: NURSE PRACTITIONER

## 2022-12-01 PROCEDURE — 3008F BODY MASS INDEX DOCD: CPT | Mod: CPTII,S$GLB,, | Performed by: NURSE PRACTITIONER

## 2022-12-01 PROCEDURE — 99999 PR PBB SHADOW E&M-EST. PATIENT-LVL III: CPT | Mod: PBBFAC,,, | Performed by: NURSE PRACTITIONER

## 2022-12-01 PROCEDURE — 3008F PR BODY MASS INDEX (BMI) DOCUMENTED: ICD-10-PCS | Mod: CPTII,S$GLB,, | Performed by: NURSE PRACTITIONER

## 2022-12-01 PROCEDURE — 3074F SYST BP LT 130 MM HG: CPT | Mod: CPTII,S$GLB,, | Performed by: NURSE PRACTITIONER

## 2022-12-01 PROCEDURE — 3078F DIAST BP <80 MM HG: CPT | Mod: CPTII,S$GLB,, | Performed by: NURSE PRACTITIONER

## 2022-12-01 PROCEDURE — 76536 US SOFT TISSUE HEAD NECK THYROID: ICD-10-PCS | Mod: 26,,, | Performed by: RADIOLOGY

## 2022-12-01 PROCEDURE — 88175 CYTOPATH C/V AUTO FLUID REDO: CPT | Performed by: NURSE PRACTITIONER

## 2022-12-01 PROCEDURE — 3078F PR MOST RECENT DIASTOLIC BLOOD PRESSURE < 80 MM HG: ICD-10-PCS | Mod: CPTII,S$GLB,, | Performed by: NURSE PRACTITIONER

## 2022-12-01 PROCEDURE — 3074F PR MOST RECENT SYSTOLIC BLOOD PRESSURE < 130 MM HG: ICD-10-PCS | Mod: CPTII,S$GLB,, | Performed by: NURSE PRACTITIONER

## 2022-12-01 PROCEDURE — 99999 PR PBB SHADOW E&M-EST. PATIENT-LVL III: ICD-10-PCS | Mod: PBBFAC,,, | Performed by: NURSE PRACTITIONER

## 2022-12-01 NOTE — PROGRESS NOTES
CC: Annual  HPI: Pt is a 27 y.o.  female who presents for routine annual exam. She is new to [facility] from [place], [place]. 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 She has 2 yo girl/ boy twins.  She is not currently using.  She stopped due to recent labs revealing hyperthyroidism. She has upcoming thyroid US scheduled. She is hesitant to start meds in fear of weight gain. She does have symptoms of heart palpitations and sweating.  She does not want STD screening.  Denies any GYN complaints.  The patient participates in regular exercise: yes.  The patient does not smoke.  The patient wears seatbelts.   Pt denies any domestic violence. She is s/p the HPV vaccine series.        FH:  Breast cancer: none  Colon cancer: none  Ovarian cancer: none  Endometrial cancer: none    ROS:  GENERAL: Feeling well overall. Denies fever or chills.   SKIN: Denies rash or lesions.   HEAD: Denies head injury or headache.   NODES: Denies enlarged lymph nodes.   CHEST: Denies chest pain or shortness of breath.   CARDIOVASCULAR: Denies palpitations or left sided chest pain.   ABDOMEN: No abdominal pain, constipation, diarrhea, nausea, vomiting or rectal bleeding.   URINARY: No dysuria, hematuria, or burning on urination.  REPRODUCTIVE: See HPI.   BREASTS: Denies pain, lumps, or nipple discharge.   HEMATOLOGIC: No easy bruisability or excessive bleeding.   MUSCULOSKELETAL: Denies joint pain or swelling.   NEUROLOGIC: Denies syncope or weakness.   PSYCHIATRIC: Denies depression, anxiety or mood swings.    PE:   APPEARANCE: Well nourished, well developed, White female in no acute distress.  NODES: no cervical, supraclavicular, or inguinal lymphadenopathy  BREASTS: Symmetrical, no skin changes or visible lesions. No palpable masses, nipple discharge or adenopathy bilaterally.  ABDOMEN: Soft. No tenderness or masses. No distention. No hernias palpated. No CVA tenderness.  VULVA: No lesions. Normal external  female genitalia.  URETHRAL MEATUS: Normal size and location, no lesions, no prolapse.  URETHRA: No masses, tenderness, or prolapse.  VAGINA: Moist. No lesions or lacerations noted. No abnormal discharge present. No odor present.   CERVIX: No lesions or discharge. No cervical motion tenderness.   UTERUS: Normal size, regular shape, mobile, non-tender.  ADNEXA: No tenderness. No fullness or masses palpated in the adnexal regions.   ANUS PERINEUM: Normal.      Diagnosis:  1. Women's annual routine gynecological examination    2. Wellness examination    3. Encounter for Papanicolaou smear for cervical cancer screening    4. Hyperthyroidism        Plan:   Pap  Referral to endocrinology to discuss med management with hyperthyroidism         Orders Placed This Encounter    Ambulatory referral/consult to Endocrinology    Liquid-Based Pap Smear, Screening       Patient was counseled today on the new ACS guidelines for cervical cytology screening as well as the current recommendations for breast cancer screening. She was counseled to follow up with her PCP for other routine health maintenance. Counseling session lasted approximately 10 minutes, and all her questions were answered.    Follow-up with me in 1 year for routine exam      JOYCE Turner

## 2022-12-05 ENCOUNTER — PATIENT MESSAGE (OUTPATIENT)
Dept: INTERNAL MEDICINE | Facility: CLINIC | Age: 27
End: 2022-12-05
Payer: COMMERCIAL

## 2022-12-06 ENCOUNTER — PATIENT MESSAGE (OUTPATIENT)
Dept: INTERNAL MEDICINE | Facility: CLINIC | Age: 27
End: 2022-12-06
Payer: COMMERCIAL

## 2022-12-08 LAB
FINAL PATHOLOGIC DIAGNOSIS: NORMAL
Lab: NORMAL

## 2022-12-09 DIAGNOSIS — E05.90 HYPERTHYROIDISM: Primary | ICD-10-CM

## 2022-12-09 RX ORDER — METHIMAZOLE 5 MG/1
5 TABLET ORAL 3 TIMES DAILY
Qty: 30 TABLET | Refills: 2 | Status: SHIPPED | OUTPATIENT
Start: 2022-12-09 | End: 2022-12-09 | Stop reason: SDUPTHER

## 2022-12-09 RX ORDER — METHIMAZOLE 5 MG/1
5 TABLET ORAL DAILY
Qty: 30 TABLET | Refills: 2 | Status: SHIPPED | OUTPATIENT
Start: 2022-12-09 | End: 2023-04-26 | Stop reason: SDUPTHER

## 2022-12-09 NOTE — PROGRESS NOTES
US consistent with thyroiditis, as suspected by her 11/2022 labs.  Since she can't get in with endo until April, starting her on methimazole 5 mg daily, will need repeat thyroid labs in 4 weeks to see how this is working, please schedule

## 2023-01-09 ENCOUNTER — LAB VISIT (OUTPATIENT)
Dept: LAB | Facility: HOSPITAL | Age: 28
End: 2023-01-09
Payer: COMMERCIAL

## 2023-01-09 DIAGNOSIS — E05.90 HYPERTHYROIDISM: ICD-10-CM

## 2023-01-09 LAB
T3 SERPL-MCNC: 221 NG/DL (ref 60–180)
T4 FREE SERPL-MCNC: 1.72 NG/DL (ref 0.71–1.51)
TSH SERPL DL<=0.005 MIU/L-ACNC: <0.01 UIU/ML (ref 0.4–4)

## 2023-01-09 PROCEDURE — 84439 ASSAY OF FREE THYROXINE: CPT | Performed by: INTERNAL MEDICINE

## 2023-01-09 PROCEDURE — 83520 IMMUNOASSAY QUANT NOS NONAB: CPT | Performed by: INTERNAL MEDICINE

## 2023-01-09 PROCEDURE — 84480 ASSAY TRIIODOTHYRONINE (T3): CPT | Performed by: INTERNAL MEDICINE

## 2023-01-09 PROCEDURE — 84445 ASSAY OF TSI GLOBULIN: CPT | Performed by: INTERNAL MEDICINE

## 2023-01-09 PROCEDURE — 84443 ASSAY THYROID STIM HORMONE: CPT | Performed by: INTERNAL MEDICINE

## 2023-01-10 LAB — TSH RECEP AB SER-ACNC: 34 IU/L (ref 0–1.75)

## 2023-01-12 LAB — TSI SER-ACNC: 25.1 IU/L

## 2023-01-23 ENCOUNTER — PATIENT MESSAGE (OUTPATIENT)
Dept: INTERNAL MEDICINE | Facility: CLINIC | Age: 28
End: 2023-01-23
Payer: COMMERCIAL

## 2023-01-23 DIAGNOSIS — E05.90 HYPERTHYROIDISM: Primary | ICD-10-CM

## 2023-02-13 ENCOUNTER — LAB VISIT (OUTPATIENT)
Dept: LAB | Facility: HOSPITAL | Age: 28
End: 2023-02-13
Payer: COMMERCIAL

## 2023-02-13 DIAGNOSIS — E05.90 HYPERTHYROIDISM: ICD-10-CM

## 2023-02-13 LAB
T3 SERPL-MCNC: 103 NG/DL (ref 60–180)
T4 FREE SERPL-MCNC: 1.06 NG/DL (ref 0.71–1.51)
TSH SERPL DL<=0.005 MIU/L-ACNC: <0.01 UIU/ML (ref 0.4–4)

## 2023-02-13 PROCEDURE — 36415 COLL VENOUS BLD VENIPUNCTURE: CPT | Mod: PO | Performed by: INTERNAL MEDICINE

## 2023-02-13 PROCEDURE — 84439 ASSAY OF FREE THYROXINE: CPT | Performed by: INTERNAL MEDICINE

## 2023-02-13 PROCEDURE — 84443 ASSAY THYROID STIM HORMONE: CPT | Performed by: INTERNAL MEDICINE

## 2023-02-13 PROCEDURE — 84480 ASSAY TRIIODOTHYRONINE (T3): CPT | Performed by: INTERNAL MEDICINE

## 2023-02-28 ENCOUNTER — OFFICE VISIT (OUTPATIENT)
Dept: ENDOCRINOLOGY | Facility: CLINIC | Age: 28
End: 2023-02-28
Payer: COMMERCIAL

## 2023-02-28 DIAGNOSIS — E05.00 GRAVES DISEASE: Primary | ICD-10-CM

## 2023-02-28 PROCEDURE — 1160F RVW MEDS BY RX/DR IN RCRD: CPT | Mod: CPTII,95,, | Performed by: GENERAL ACUTE CARE HOSPITAL

## 2023-02-28 PROCEDURE — 1159F PR MEDICATION LIST DOCUMENTED IN MEDICAL RECORD: ICD-10-PCS | Mod: CPTII,95,, | Performed by: GENERAL ACUTE CARE HOSPITAL

## 2023-02-28 PROCEDURE — 1159F MED LIST DOCD IN RCRD: CPT | Mod: CPTII,95,, | Performed by: GENERAL ACUTE CARE HOSPITAL

## 2023-02-28 PROCEDURE — 1160F PR REVIEW ALL MEDS BY PRESCRIBER/CLIN PHARMACIST DOCUMENTED: ICD-10-PCS | Mod: CPTII,95,, | Performed by: GENERAL ACUTE CARE HOSPITAL

## 2023-02-28 PROCEDURE — 99204 OFFICE O/P NEW MOD 45 MIN: CPT | Mod: 95,,, | Performed by: GENERAL ACUTE CARE HOSPITAL

## 2023-02-28 PROCEDURE — 99204 PR OFFICE/OUTPT VISIT, NEW, LEVL IV, 45-59 MIN: ICD-10-PCS | Mod: 95,,, | Performed by: GENERAL ACUTE CARE HOSPITAL

## 2023-02-28 NOTE — PROGRESS NOTES
Subjective:      Patient ID: Magnolia Shen is a 27 y.o.    The patient location is: HOME   The chief complaint leading to consultation is: Grave's disease     Visit type: audiovisual    Face to Face time with patient: 20 minutes   30 minutes of total time spent on the encounter, which includes face to face time and non-face to face time preparing to see the patient (eg, review of tests), Obtaining and/or reviewing separately obtained history, Documenting clinical information in the electronic or other health record, Independently interpreting results (not separately reported) and communicating results to the patient/family/caregiver, or Care coordination (not separately reported).         Each patient to whom he or she provides medical services by telemedicine is:  (1) informed of the relationship between the physician and patient and the respective role of any other health care provider with respect to management of the patient; and (2) notified that he or she may decline to receive medical services by telemedicine and may withdraw from such care at any time.    Notes:      Chief Complaint:  Grave's disease; Initial visit     There is no problem list on file for this patient.       History of Present Illness  28 YO Female that presents to the endocrine clinic for initial evaluation of Grave's disease.          With regards to Hyperthyroidism:  Grave's disease        Latest Reference Range & Units 10/17/22 10:15 11/15/22 07:15 01/09/23 07:23 02/13/23 08:23   TSH 0.400 - 4.000 uIU/mL <0.010 (L) <0.010 (L) <0.010 (L) <0.010 (L)   T3, Total 60 - 180 ng/dL  239 (H) 221 (H) 103   Free T4 0.71 - 1.51 ng/dL 1.57 (H) 1.67 (H) 1.72 (H) 1.06   Thyrotropin Receptor Ab 0.00 - 1.75 IU/L   34 (H)    Thyroid-Stim IG Quantitative <0.10 IU/L   25.10 (H)    (L): Data is abnormally low  (H): Data is abnormally high      Recently Dx on 12/2022 due to symptoms of Palpitations (SVT),  heat intolerance and weight loss       TRAB/ TSI  ++       Pt was started on methimazole 5mg daily on 12/2022     Now FT4 and T3 are WNL but TSH remains suppressed    Symptoms?   Denies hyperthyroid symptoms.  Reports mild hypothyroid symptoms of weight gain, constipation, dry skin         -Eye disease?  No evidence of active disease      -CBC and LFT? WNL     -Family history of Thyroid disease?   Grandmother and Aunt  have hypothyroidism.       -Thyroid US? 12/2022:   FINDINGS:  The thyroid is normal in size.  Right lobe of the thyroid measures 4.4 x 1.7 x 2.0 cm.  Left lobe of the thyroid measures 4.4 x 1.8 x 1.5 cm.  AP isthmus is 0.2 cm in thickness.  Diffuse hypervascular thyroid parenchyma.  0.5 x 0.3 x 0.3 cm left midpole hypoechoic solid nodule (TR 4).  No nodules meeting criteria for follow-up or FNA.     No cervical lymphadenopathy.     Impression:     Hypervascular thyroid parenchyma which can be seen with sequela of thyroiditis.    Pt is 1.5 yrs  post partum      Denies neck pain or tenderness     ROS:   As above    Objective:     There were no vitals taken for this visit.    There is no height or weight on file to calculate BMI.      Physical Exam (NOT PERFORMED, VIRTUAL VISIT)            Lab Review:   No results found for: HGBA1C  Lab Results   Component Value Date    CHOL 137 10/17/2022    HDL 45 10/17/2022    LDLCALC 78.6 10/17/2022    TRIG 67 10/17/2022    CHOLHDL 32.8 10/17/2022     Lab Results   Component Value Date     10/17/2022    K 5.1 10/17/2022     10/17/2022    CO2 20 (L) 10/17/2022    GLU 88 10/17/2022    BUN 18 10/17/2022    CREATININE 0.8 10/17/2022    CALCIUM 10.2 10/17/2022    PROT 7.5 10/17/2022    ALBUMIN 4.0 10/17/2022    BILITOT 0.4 10/17/2022    ALKPHOS 95 10/17/2022    AST 19 10/17/2022    ALT 16 10/17/2022    ANIONGAP 13 10/17/2022    TSH <0.010 (L) 02/13/2023     No results found for: [id number]    Assessment and Plan     Graves disease         Doing well on Low dose methimazole 5mg daily.     FT4 and T3 are  WNL    Hyperthyroid symptoms have resolved     Therapy options (medical management, LILLY and Thyroidectomy) discussed     Will continue with medical management with methimazole 5mg daily for now     Check TFTs in 4 weeks for monitoring     Will likely keep methimazole for 18 - 24 months before evaluating for remission.     Will monitor

## 2023-03-13 ENCOUNTER — PATIENT MESSAGE (OUTPATIENT)
Dept: ENDOCRINOLOGY | Facility: CLINIC | Age: 28
End: 2023-03-13
Payer: COMMERCIAL

## 2023-03-13 ENCOUNTER — LAB VISIT (OUTPATIENT)
Dept: LAB | Facility: HOSPITAL | Age: 28
End: 2023-03-13
Attending: GENERAL ACUTE CARE HOSPITAL
Payer: COMMERCIAL

## 2023-03-13 DIAGNOSIS — E05.00 GRAVES DISEASE: ICD-10-CM

## 2023-03-13 LAB
ALBUMIN SERPL BCP-MCNC: 4.4 G/DL (ref 3.5–5.2)
ALP SERPL-CCNC: 133 U/L (ref 55–135)
ALT SERPL W/O P-5'-P-CCNC: 24 U/L (ref 10–44)
ANION GAP SERPL CALC-SCNC: 8 MMOL/L (ref 8–16)
AST SERPL-CCNC: 26 U/L (ref 10–40)
BASOPHILS # BLD AUTO: 0.03 K/UL (ref 0–0.2)
BASOPHILS NFR BLD: 0.6 % (ref 0–1.9)
BILIRUB SERPL-MCNC: 0.8 MG/DL (ref 0.1–1)
BUN SERPL-MCNC: 22 MG/DL (ref 6–20)
CALCIUM SERPL-MCNC: 9.8 MG/DL (ref 8.7–10.5)
CHLORIDE SERPL-SCNC: 105 MMOL/L (ref 95–110)
CO2 SERPL-SCNC: 25 MMOL/L (ref 23–29)
CREAT SERPL-MCNC: 0.8 MG/DL (ref 0.5–1.4)
DIFFERENTIAL METHOD: NORMAL
EOSINOPHIL # BLD AUTO: 0.1 K/UL (ref 0–0.5)
EOSINOPHIL NFR BLD: 2.3 % (ref 0–8)
ERYTHROCYTE [DISTWIDTH] IN BLOOD BY AUTOMATED COUNT: 13.3 % (ref 11.5–14.5)
EST. GFR  (NO RACE VARIABLE): >60 ML/MIN/1.73 M^2
GLUCOSE SERPL-MCNC: 75 MG/DL (ref 70–110)
HCT VFR BLD AUTO: 43.6 % (ref 37–48.5)
HGB BLD-MCNC: 14.2 G/DL (ref 12–16)
IMM GRANULOCYTES # BLD AUTO: 0.01 K/UL (ref 0–0.04)
IMM GRANULOCYTES NFR BLD AUTO: 0.2 % (ref 0–0.5)
LYMPHOCYTES # BLD AUTO: 2.1 K/UL (ref 1–4.8)
LYMPHOCYTES NFR BLD: 40.1 % (ref 18–48)
MCH RBC QN AUTO: 29.6 PG (ref 27–31)
MCHC RBC AUTO-ENTMCNC: 32.6 G/DL (ref 32–36)
MCV RBC AUTO: 91 FL (ref 82–98)
MONOCYTES # BLD AUTO: 0.5 K/UL (ref 0.3–1)
MONOCYTES NFR BLD: 9 % (ref 4–15)
NEUTROPHILS # BLD AUTO: 2.5 K/UL (ref 1.8–7.7)
NEUTROPHILS NFR BLD: 47.8 % (ref 38–73)
NRBC BLD-RTO: 0 /100 WBC
PLATELET # BLD AUTO: 226 K/UL (ref 150–450)
PMV BLD AUTO: 9.8 FL (ref 9.2–12.9)
POTASSIUM SERPL-SCNC: 4.5 MMOL/L (ref 3.5–5.1)
PROT SERPL-MCNC: 7.4 G/DL (ref 6–8.4)
RBC # BLD AUTO: 4.8 M/UL (ref 4–5.4)
SODIUM SERPL-SCNC: 138 MMOL/L (ref 136–145)
T3 SERPL-MCNC: 111 NG/DL (ref 60–180)
T4 FREE SERPL-MCNC: 0.95 NG/DL (ref 0.71–1.51)
TSH SERPL DL<=0.005 MIU/L-ACNC: <0.01 UIU/ML (ref 0.4–4)
WBC # BLD AUTO: 5.24 K/UL (ref 3.9–12.7)

## 2023-03-13 PROCEDURE — 80053 COMPREHEN METABOLIC PANEL: CPT | Performed by: GENERAL ACUTE CARE HOSPITAL

## 2023-03-13 PROCEDURE — 84443 ASSAY THYROID STIM HORMONE: CPT | Performed by: GENERAL ACUTE CARE HOSPITAL

## 2023-03-13 PROCEDURE — 84439 ASSAY OF FREE THYROXINE: CPT | Performed by: GENERAL ACUTE CARE HOSPITAL

## 2023-03-13 PROCEDURE — 84480 ASSAY TRIIODOTHYRONINE (T3): CPT | Performed by: GENERAL ACUTE CARE HOSPITAL

## 2023-03-13 PROCEDURE — 85025 COMPLETE CBC W/AUTO DIFF WBC: CPT | Performed by: GENERAL ACUTE CARE HOSPITAL

## 2023-03-13 PROCEDURE — 36415 COLL VENOUS BLD VENIPUNCTURE: CPT | Mod: PO | Performed by: GENERAL ACUTE CARE HOSPITAL

## 2023-03-14 ENCOUNTER — PATIENT MESSAGE (OUTPATIENT)
Dept: ENDOCRINOLOGY | Facility: CLINIC | Age: 28
End: 2023-03-14
Payer: COMMERCIAL

## 2023-03-14 DIAGNOSIS — E05.00 GRAVES DISEASE: Primary | ICD-10-CM

## 2023-04-26 DIAGNOSIS — E05.90 HYPERTHYROIDISM: ICD-10-CM

## 2023-04-26 RX ORDER — METHIMAZOLE 5 MG/1
5 TABLET ORAL DAILY
Qty: 30 TABLET | Refills: 2 | Status: SHIPPED | OUTPATIENT
Start: 2023-04-26 | End: 2023-10-16 | Stop reason: SDUPTHER

## 2023-04-26 NOTE — TELEPHONE ENCOUNTER
No new care gaps identified.  [facility] Embedded Care Gaps. Reference number: [id number]. 4/26/2023   6:02:25 AM CDT

## 2023-05-09 ENCOUNTER — LAB VISIT (OUTPATIENT)
Dept: LAB | Facility: HOSPITAL | Age: 28
End: 2023-05-09
Attending: GENERAL ACUTE CARE HOSPITAL
Payer: COMMERCIAL

## 2023-05-09 ENCOUNTER — PATIENT MESSAGE (OUTPATIENT)
Dept: ENDOCRINOLOGY | Facility: CLINIC | Age: 28
End: 2023-05-09
Payer: COMMERCIAL

## 2023-05-09 DIAGNOSIS — E05.00 GRAVES DISEASE: Primary | ICD-10-CM

## 2023-05-09 DIAGNOSIS — E05.00 GRAVES DISEASE: ICD-10-CM

## 2023-05-09 LAB
T4 FREE SERPL-MCNC: 1.31 NG/DL (ref 0.71–1.51)
TSH SERPL DL<=0.005 MIU/L-ACNC: <0.01 UIU/ML (ref 0.4–4)

## 2023-05-09 PROCEDURE — 84439 ASSAY OF FREE THYROXINE: CPT | Performed by: GENERAL ACUTE CARE HOSPITAL

## 2023-05-09 PROCEDURE — 84443 ASSAY THYROID STIM HORMONE: CPT | Performed by: GENERAL ACUTE CARE HOSPITAL

## 2023-05-09 PROCEDURE — 36415 COLL VENOUS BLD VENIPUNCTURE: CPT | Mod: PO | Performed by: GENERAL ACUTE CARE HOSPITAL

## 2023-06-06 ENCOUNTER — PATIENT MESSAGE (OUTPATIENT)
Dept: ENDOCRINOLOGY | Facility: CLINIC | Age: 28
End: 2023-06-06
Payer: COMMERCIAL

## 2023-06-27 ENCOUNTER — LAB VISIT (OUTPATIENT)
Dept: LAB | Facility: HOSPITAL | Age: 28
End: 2023-06-27
Attending: GENERAL ACUTE CARE HOSPITAL
Payer: COMMERCIAL

## 2023-06-27 DIAGNOSIS — E05.00 GRAVES DISEASE: ICD-10-CM

## 2023-06-27 LAB
T4 FREE SERPL-MCNC: 1.36 NG/DL (ref 0.71–1.51)
TSH SERPL DL<=0.005 MIU/L-ACNC: <0.01 UIU/ML (ref 0.4–4)

## 2023-06-27 PROCEDURE — 36415 COLL VENOUS BLD VENIPUNCTURE: CPT | Mod: PO | Performed by: GENERAL ACUTE CARE HOSPITAL

## 2023-06-27 PROCEDURE — 84443 ASSAY THYROID STIM HORMONE: CPT | Performed by: GENERAL ACUTE CARE HOSPITAL

## 2023-06-27 PROCEDURE — 84439 ASSAY OF FREE THYROXINE: CPT | Performed by: GENERAL ACUTE CARE HOSPITAL

## 2023-07-10 PROBLEM — E05.00 GRAVES DISEASE: Status: ACTIVE | Noted: 2023-07-10

## 2023-07-10 NOTE — PROGRESS NOTES
Subjective:      Chief Complaint:  Grave's disease     History of Present Illness    Magnolia Shen is a 28 y.o. female who presents for follow up of Grave's disease. She was seen by Darron Mims MD on 2/28/2023.    Dx with Grave's disease on 12/2022 due to symptoms of palpitations (SVT), heat intolerance. She denies weight loss.   TRAB/ TSI ++     Pt was started on methimazole 5 mg daily on 12/2022. Her dose was decreased to 2.5 mg daily in 3/2023 as she reported symptoms of fatigue, constipation and weight gain on the 5 mg dose.      Her  FT4 and T3 are WNL since 2/2023 but TSH remains suppressed.     Eye disease:  No evidence of active disease   CBC and LFT: WNL in 3/2023  Family history of Thyroid disease: Grandmother and Aunt have hypothyroidism.        Thyroid US: 12/2022:   FINDINGS:  The thyroid is normal in size.  Right lobe of the thyroid measures 4.4 x 1.7 x 2.0 cm.  Left lobe of the thyroid measures 4.4 x 1.8 x 1.5 cm.  AP isthmus is 0.2 cm in thickness.  Diffuse hypervascular thyroid parenchyma.  0.5 x 0.3 x 0.3 cm left midpole hypoechoic solid nodule (TR 4).  No nodules meeting criteria for follow-up or FNA.     No cervical lymphadenopathy.     Impression:  Hypervascular thyroid parenchyma which can be seen with sequela of thyroiditis.     Patient is 2 yrs post partum with twins.   No plans any pregnancy, currently not on birth control.  Denies neck pain or tenderness.     Lab Results   Component Value Date    TSH <0.010 (L) 06/27/2023    TSH <0.010 (L) 05/09/2023    TSH <0.010 (L) 03/13/2023    TSH <0.010 (L) 02/13/2023    TSH <0.010 (L) 01/09/2023    TSH <0.010 (L) 11/15/2022    TSH <0.010 (L) 10/17/2022    FREET4 1.36 06/27/2023    FREET4 1.31 05/09/2023    FREET4 0.95 03/13/2023    FREET4 1.06 02/13/2023    FREET4 1.72 (H) 01/09/2023    FREET4 1.67 (H) 11/15/2022    FREET4 1.57 (H) 10/17/2022        ROS:   As above    Objective:     /73 (BP Location: Right arm, Patient Position: Sitting, BP  Method: Medium (Automatic))   Pulse 67   Wt 60.2 kg (132 lb 11.5 oz)   SpO2 95%   BMI 20.79 kg/m²     Body mass index is 20.79 kg/m².    Physical Exam  Constitutional:       General: She is not in acute distress.     Appearance: She is not ill-appearing.   HENT:      Head: Normocephalic.   Cardiovascular:      Rate and Rhythm: Normal rate and regular rhythm.   Pulmonary:      Effort: Pulmonary effort is normal.   Abdominal:      General: Abdomen is flat.      Palpations: Abdomen is soft.   Musculoskeletal:      Cervical back: Neck supple.   Skin:     General: Skin is warm and dry.   Neurological:      Mental Status: She is alert and oriented to person, place, and time.         Lab Review:   No results found for: HGBA1C  Lab Results   Component Value Date    CHOL 137 10/17/2022    HDL 45 10/17/2022    LDLCALC 78.6 10/17/2022    TRIG 67 10/17/2022    CHOLHDL 32.8 10/17/2022     Lab Results   Component Value Date     03/13/2023    K 4.5 03/13/2023     03/13/2023    CO2 25 03/13/2023    GLU 75 03/13/2023    BUN 22 (H) 03/13/2023    CREATININE 0.8 03/13/2023    CALCIUM 9.8 03/13/2023    PROT 7.4 03/13/2023    ALBUMIN 4.4 03/13/2023    BILITOT 0.8 03/13/2023    ALKPHOS 133 03/13/2023    AST 26 03/13/2023    ALT 24 03/13/2023    ANIONGAP 8 03/13/2023    TSH <0.010 (L) 06/27/2023     No results found for: [id number]    Assessment and Plan       Problem List Items Addressed This Visit          Endocrine    Graves disease - Primary       Dx in 12/2022 with positive antibodies.  US 12/2022: Hypervascular thyroid parenchyma which can be seen with sequela of thyroiditis.    Pt was started on methimazole 5 mg daily on 12/2022. Her dose was decreased to 2.5 mg daily in 3/2023 as she reported symptoms of fatigue, constipation and weight gain.   TSH is persistently suppressed with normal FT4 since 2/2023.   It may take sometime for TSH to improve since she is in small dose of methimazole.     Recommended increasing  the dose of methimazole but patient would like to continue the 2.5 mg daily dose due to concerns for fatigue and weight gain on the higher dose.   Repeat labs in 8 weeks to decide we need to adjust the dose of methimazole.   Call if she has any hyperthyroid symptoms.     Patient is not currently planning on pregnancy but has been advised to call if she gets pregnancy or plans on pregnancy.   Recommended getting her Grave's disease under better control.   Discussed the management of Grave's disease with PTU and MMI during pregnancy. Possible side effects with the medications on the developing fetus.     Side effects of methimazole discussed with patient.                 Relevant Orders    TSH    T4, Free    Hyperthyroidism        Follow up in about 6 months (around 1/11/2024).     Aura JORDAN Rai, MD

## 2023-07-11 ENCOUNTER — OFFICE VISIT (OUTPATIENT)
Dept: ENDOCRINOLOGY | Facility: CLINIC | Age: 28
End: 2023-07-11
Payer: COMMERCIAL

## 2023-07-11 VITALS
HEART RATE: 67 BPM | BODY MASS INDEX: 20.79 KG/M2 | OXYGEN SATURATION: 95 % | SYSTOLIC BLOOD PRESSURE: 114 MMHG | DIASTOLIC BLOOD PRESSURE: 73 MMHG | WEIGHT: 132.69 LBS

## 2023-07-11 DIAGNOSIS — E05.00 GRAVES DISEASE: Primary | ICD-10-CM

## 2023-07-11 DIAGNOSIS — E05.90 HYPERTHYROIDISM: ICD-10-CM

## 2023-07-11 PROCEDURE — 3074F PR MOST RECENT SYSTOLIC BLOOD PRESSURE < 130 MM HG: ICD-10-PCS | Mod: CPTII,S$GLB,, | Performed by: INTERNAL MEDICINE

## 2023-07-11 PROCEDURE — 3008F PR BODY MASS INDEX (BMI) DOCUMENTED: ICD-10-PCS | Mod: CPTII,S$GLB,, | Performed by: INTERNAL MEDICINE

## 2023-07-11 PROCEDURE — 3008F BODY MASS INDEX DOCD: CPT | Mod: CPTII,S$GLB,, | Performed by: INTERNAL MEDICINE

## 2023-07-11 PROCEDURE — 3078F PR MOST RECENT DIASTOLIC BLOOD PRESSURE < 80 MM HG: ICD-10-PCS | Mod: CPTII,S$GLB,, | Performed by: INTERNAL MEDICINE

## 2023-07-11 PROCEDURE — 1159F MED LIST DOCD IN RCRD: CPT | Mod: CPTII,S$GLB,, | Performed by: INTERNAL MEDICINE

## 2023-07-11 PROCEDURE — 99999 PR PBB SHADOW E&M-EST. PATIENT-LVL III: CPT | Mod: PBBFAC,,, | Performed by: INTERNAL MEDICINE

## 2023-07-11 PROCEDURE — 3078F DIAST BP <80 MM HG: CPT | Mod: CPTII,S$GLB,, | Performed by: INTERNAL MEDICINE

## 2023-07-11 PROCEDURE — 3074F SYST BP LT 130 MM HG: CPT | Mod: CPTII,S$GLB,, | Performed by: INTERNAL MEDICINE

## 2023-07-11 PROCEDURE — 99214 OFFICE O/P EST MOD 30 MIN: CPT | Mod: S$GLB,,, | Performed by: INTERNAL MEDICINE

## 2023-07-11 PROCEDURE — 99214 PR OFFICE/OUTPT VISIT, EST, LEVL IV, 30-39 MIN: ICD-10-PCS | Mod: S$GLB,,, | Performed by: INTERNAL MEDICINE

## 2023-07-11 PROCEDURE — 1160F PR REVIEW ALL MEDS BY PRESCRIBER/CLIN PHARMACIST DOCUMENTED: ICD-10-PCS | Mod: CPTII,S$GLB,, | Performed by: INTERNAL MEDICINE

## 2023-07-11 PROCEDURE — 1159F PR MEDICATION LIST DOCUMENTED IN MEDICAL RECORD: ICD-10-PCS | Mod: CPTII,S$GLB,, | Performed by: INTERNAL MEDICINE

## 2023-07-11 PROCEDURE — 99999 PR PBB SHADOW E&M-EST. PATIENT-LVL III: ICD-10-PCS | Mod: PBBFAC,,, | Performed by: INTERNAL MEDICINE

## 2023-07-11 PROCEDURE — 1160F RVW MEDS BY RX/DR IN RCRD: CPT | Mod: CPTII,S$GLB,, | Performed by: INTERNAL MEDICINE

## 2023-07-11 NOTE — ASSESSMENT & PLAN NOTE
Dx in 12/2022 with positive antibodies.  US 12/2022: Hypervascular thyroid parenchyma which can be seen with sequela of thyroiditis.    Pt was started on methimazole 5 mg daily on 12/2022. Her dose was decreased to 2.5 mg daily in 3/2023 as she reported symptoms of fatigue, constipation and weight gain.   TSH is persistently suppressed with normal FT4 since 2/2023.   It may take sometime for TSH to improve since she is in small dose of methimazole.     Recommended increasing the dose of methimazole but patient would like to continue the 2.5 mg daily dose due to concerns for fatigue and weight gain on the higher dose.   Repeat labs in 8 weeks to decide we need to adjust the dose of methimazole.   Call if she has any hyperthyroid symptoms.     Patient is not currently planning on pregnancy but has been advised to call if she gets pregnancy or plans on pregnancy.   Recommended getting her Grave's disease under better control.   Discussed the management of Grave's disease with PTU and MMI during pregnancy. Possible side effects with the medications on the developing fetus.     Side effects of methimazole discussed with patient.

## 2023-08-08 ENCOUNTER — TELEPHONE (OUTPATIENT)
Dept: ENDOCRINOLOGY | Facility: CLINIC | Age: 28
End: 2023-08-08
Payer: COMMERCIAL

## 2023-08-08 NOTE — TELEPHONE ENCOUNTER
----- Message from Dipika Everett MA sent at 8/8/2023 10:05 AM CDT -----  Regarding: FW: Provider Switch  Contact: [phone]    ----- Message -----  From: Thanh Maldonado  Sent: 8/8/2023   9:59 AM CDT  To: Evangelista JORDAN Staff  Subject: Provider Switch                                  Pt is calling in ref to her appt on 1/11. She is requesting to switch providers. Pt would like to see provider Julia Childers. Patient Requesting Call Back @ [phone]

## 2023-09-05 ENCOUNTER — LAB VISIT (OUTPATIENT)
Dept: LAB | Facility: HOSPITAL | Age: 28
End: 2023-09-05
Attending: INTERNAL MEDICINE
Payer: COMMERCIAL

## 2023-09-05 DIAGNOSIS — E05.00 GRAVES DISEASE: ICD-10-CM

## 2023-09-05 LAB
T4 FREE SERPL-MCNC: 1.21 NG/DL (ref 0.71–1.51)
TSH SERPL DL<=0.005 MIU/L-ACNC: <0.01 UIU/ML (ref 0.4–4)

## 2023-09-05 PROCEDURE — 84443 ASSAY THYROID STIM HORMONE: CPT | Performed by: INTERNAL MEDICINE

## 2023-09-05 PROCEDURE — 84439 ASSAY OF FREE THYROXINE: CPT | Performed by: INTERNAL MEDICINE

## 2023-09-05 PROCEDURE — 36415 COLL VENOUS BLD VENIPUNCTURE: CPT | Performed by: INTERNAL MEDICINE

## 2023-09-06 ENCOUNTER — PATIENT MESSAGE (OUTPATIENT)
Dept: ENDOCRINOLOGY | Facility: CLINIC | Age: 28
End: 2023-09-06
Payer: COMMERCIAL

## 2023-09-06 DIAGNOSIS — E05.90 HYPERTHYROIDISM: Primary | ICD-10-CM

## 2023-10-16 DIAGNOSIS — E05.90 HYPERTHYROIDISM: ICD-10-CM

## 2023-10-16 NOTE — TELEPHONE ENCOUNTER
No care due was identified.  [facility] Embedded Care Due Messages. Reference number: [id number].   10/16/2023 8:37:12 AM CDT

## 2023-10-17 RX ORDER — METHIMAZOLE 5 MG/1
5 TABLET ORAL DAILY
Qty: 30 TABLET | Refills: 2 | Status: SHIPPED | OUTPATIENT
Start: 2023-10-17 | End: 2024-10-16

## 2023-10-26 ENCOUNTER — LAB VISIT (OUTPATIENT)
Dept: LAB | Facility: HOSPITAL | Age: 28
End: 2023-10-26
Payer: COMMERCIAL

## 2023-10-26 DIAGNOSIS — E05.90 HYPERTHYROIDISM: ICD-10-CM

## 2023-10-26 LAB
T4 FREE SERPL-MCNC: 1.2 NG/DL (ref 0.71–1.51)
TSH SERPL DL<=0.005 MIU/L-ACNC: <0.01 UIU/ML (ref 0.4–4)

## 2023-10-26 PROCEDURE — 84439 ASSAY OF FREE THYROXINE: CPT | Performed by: INTERNAL MEDICINE

## 2023-10-26 PROCEDURE — 36415 COLL VENOUS BLD VENIPUNCTURE: CPT | Performed by: INTERNAL MEDICINE

## 2023-10-26 PROCEDURE — 84443 ASSAY THYROID STIM HORMONE: CPT | Performed by: INTERNAL MEDICINE

## 2023-10-29 ENCOUNTER — PATIENT MESSAGE (OUTPATIENT)
Dept: ENDOCRINOLOGY | Facility: CLINIC | Age: 28
End: 2023-10-29
Payer: COMMERCIAL

## 2023-10-29 DIAGNOSIS — E05.90 HYPERTHYROIDISM: Primary | ICD-10-CM

## 2023-10-30 ENCOUNTER — PATIENT MESSAGE (OUTPATIENT)
Dept: ENDOCRINOLOGY | Facility: CLINIC | Age: 28
End: 2023-10-30
Payer: COMMERCIAL

## 2023-11-29 ENCOUNTER — OFFICE VISIT (OUTPATIENT)
Dept: PRIMARY CARE CLINIC | Facility: CLINIC | Age: 28
End: 2023-11-29
Payer: COMMERCIAL

## 2023-11-29 VITALS
DIASTOLIC BLOOD PRESSURE: 68 MMHG | HEIGHT: 67 IN | SYSTOLIC BLOOD PRESSURE: 118 MMHG | BODY MASS INDEX: 20.97 KG/M2 | WEIGHT: 133.63 LBS | OXYGEN SATURATION: 99 % | HEART RATE: 63 BPM

## 2023-11-29 DIAGNOSIS — E05.00 GRAVES DISEASE: ICD-10-CM

## 2023-11-29 DIAGNOSIS — Z00.00 WELLNESS EXAMINATION: Primary | ICD-10-CM

## 2023-11-29 PROCEDURE — 3008F PR BODY MASS INDEX (BMI) DOCUMENTED: ICD-10-PCS | Mod: CPTII,S$GLB,, | Performed by: INTERNAL MEDICINE

## 2023-11-29 PROCEDURE — 1159F MED LIST DOCD IN RCRD: CPT | Mod: CPTII,S$GLB,, | Performed by: INTERNAL MEDICINE

## 2023-11-29 PROCEDURE — 99395 PREV VISIT EST AGE 18-39: CPT | Mod: S$GLB,,, | Performed by: INTERNAL MEDICINE

## 2023-11-29 PROCEDURE — 3078F DIAST BP <80 MM HG: CPT | Mod: CPTII,S$GLB,, | Performed by: INTERNAL MEDICINE

## 2023-11-29 PROCEDURE — 1159F PR MEDICATION LIST DOCUMENTED IN MEDICAL RECORD: ICD-10-PCS | Mod: CPTII,S$GLB,, | Performed by: INTERNAL MEDICINE

## 2023-11-29 PROCEDURE — 3074F PR MOST RECENT SYSTOLIC BLOOD PRESSURE < 130 MM HG: ICD-10-PCS | Mod: CPTII,S$GLB,, | Performed by: INTERNAL MEDICINE

## 2023-11-29 PROCEDURE — 99999 PR PBB SHADOW E&M-EST. PATIENT-LVL III: CPT | Mod: PBBFAC,,, | Performed by: INTERNAL MEDICINE

## 2023-11-29 PROCEDURE — 3008F BODY MASS INDEX DOCD: CPT | Mod: CPTII,S$GLB,, | Performed by: INTERNAL MEDICINE

## 2023-11-29 PROCEDURE — 3078F PR MOST RECENT DIASTOLIC BLOOD PRESSURE < 80 MM HG: ICD-10-PCS | Mod: CPTII,S$GLB,, | Performed by: INTERNAL MEDICINE

## 2023-11-29 PROCEDURE — 3074F SYST BP LT 130 MM HG: CPT | Mod: CPTII,S$GLB,, | Performed by: INTERNAL MEDICINE

## 2023-11-29 PROCEDURE — 99999 PR PBB SHADOW E&M-EST. PATIENT-LVL III: ICD-10-PCS | Mod: PBBFAC,,, | Performed by: INTERNAL MEDICINE

## 2023-11-29 PROCEDURE — 99395 PR PREVENTIVE VISIT,EST,18-39: ICD-10-PCS | Mod: S$GLB,,, | Performed by: INTERNAL MEDICINE

## 2023-11-29 NOTE — PROGRESS NOTES
[facility] Note    Chief Complaint      Chief Complaint   Patient presents with    Annual Exam     History of Present Illness      Magnolia Shen is a 28 y.o. female who presents today for chief complaint annual wellness . Patient previously seen by me    PCP: Renee Bal MD  Patient comes to appointment alone.     HPI   Annual feels well no compalints  Last seen  11.22  TSH T4 10.23, CBC CMP 3.23  Lipids 10.22  She is having an URI  Graves hyperthyroidism seeing Endo Naidu. SHe is on MMI. Last labs suppressed TSH nl T4. She had a thyroid u/s in 12.22. 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  No significant fam medical hx, no surgeries, no allergies      Mood is ok, sleep is ok   Watches diet, she exercises     Flu shot   Active Problem List with Overview Notes    Diagnosis Date Noted    Hyperthyroidism 07/11/2023    Graves disease 07/10/2023       Health Maintenance   Topic Date Due    TETANUS VACCINE  Never done    Pap Smear  12/01/2025    Hepatitis C Screening  Completed    Lipid Panel  Completed       History reviewed. No pertinent past medical history.    History reviewed. No pertinent surgical history.    family history is not on file.    Social History     Tobacco Use    Smoking status: Never    Smokeless tobacco: Never   Substance Use Topics    Alcohol use: Yes     Alcohol/week: 1.0 standard drink of alcohol     Types: 1 Glasses of wine per week    Drug use: Never       Review of Systems   Constitutional:  Negative for chills, fever, malaise/fatigue and weight loss.   Respiratory:  Negative for cough, sputum production, shortness of breath and wheezing.    Cardiovascular:  Negative for chest pain, palpitations, orthopnea and  "leg swelling.   Gastrointestinal:  Negative for constipation, diarrhea, nausea and vomiting.   Genitourinary:  Negative for dysuria, frequency, hematuria and urgency.        Outpatient Encounter Medications as of 11/29/2023   Medication Sig Dispense Refill    methIMAzole (TAPAZOLE) 5 MG Tab Take 1 tablet (5 mg total) by mouth once daily. 30 tablet 2    [DISCONTINUED] norethindrone-e.estradioL-iron (LO LOESTRIN FE) 1 mg-10 mcg (24)/10 mcg (2) Tab Take 1 tablet by mouth daily (Patient not taking: Reported on 7/11/2023) 84 tablet 3     No facility-administered encounter medications on file as of 11/29/2023.        Review of patient's allergies indicates:  No Known Allergies        Physical Exam      Vital Signs  Pulse: 63  SpO2: 99 %  BP: 118/68  BP Location: Right arm  Patient Position: Sitting  Height and Weight  Height: 5' 7" (170.2 cm)  Weight: 60.6 kg (133 lb 9.6 oz)  BSA (Calculated - sq m): 1.69 sq meters  BMI (Calculated): 20.9  Weight in (lb) to have BMI = 25: 159.3]    Physical Exam  Vitals reviewed.   Constitutional:       General: She is not in acute distress.     Appearance: She is well-developed. She is not diaphoretic.   HENT:      Head: Normocephalic and atraumatic.      Right Ear: External ear normal.      Left Ear: External ear normal.      Nose: Nose normal.   Eyes:      General:         Right eye: No discharge.         Left eye: No discharge.      Conjunctiva/sclera: Conjunctivae normal.   Cardiovascular:      Rate and Rhythm: Normal rate and regular rhythm.      Heart sounds: Normal heart sounds.   Pulmonary:      Effort: Pulmonary effort is normal. No respiratory distress.      Breath sounds: Normal breath sounds.   Musculoskeletal:         General: Normal range of motion.      Cervical back: Normal range of motion.   Skin:     Coloration: Skin is not pale.      Findings: No rash.   Neurological:      Mental Status: She is alert and oriented to person, place, and time.   Psychiatric:         " "Behavior: Behavior normal.         Thought Content: Thought content normal.          Laboratory:  CBC:  No results for input(s): "WBC", "RBC", "HGB", "HCT", "PLT", "MCV", "MCH", "MCHC" in the last 2160 hours.  CMP:  No results for input(s): "GLU", "CALCIUM", "ALBUMIN", "PROT", "NA", "K", "CO2", "CL", "BUN", "ALKPHOS", "ALT", "AST", "BILITOT" in the last 2160 hours.    Invalid input(s): "CREATININ"  URINALYSIS:  No results for input(s): "COLORU", "CLARITYU", "SPECGRAV", "PHUR", "PROTEINUA", "GLUCOSEU", "BILIRUBINCON", "BLOODU", "WBCU", "RBCU", "BACTERIA", "MUCUS", "NITRITE", "LEUKOCYTESUR", "UROBILINOGEN", "HYALINECASTS" in the last 2160 hours.   LIPIDS:  Recent Labs   Lab Result Units 09/05/23  0840 10/26/23  1019   TSH uIU/mL <0.010* <0.010*     TSH:  Recent Labs   Lab Result Units 09/05/23  0840 10/26/23  1019   TSH uIU/mL <0.010* <0.010*     A1C:  No results for input(s): "HGBA1C" in the last 2160 hours.    Radiology:      Assessment/Plan     Magnolia Shen is a 28 y.o.female with:    1. Wellness examination  -     Lipid Panel; Future; Expected date: 11/29/2023  -     Comprehensive Metabolic Panel; Future; Expected date: 11/29/2023  -     CBC Without Differential; Future; Expected date: 11/29/2023    2. Graves disease  Assessment & Plan:  Per endo            Use of the [facility] Patient Portal discussed and encouraged during today's visit  -Continue current medications and maintain follow up with specialists.  Return to clinic in .  Future Appointments   Date Time Provider Department Center   12/4/2023  9:20 AM [facility] [place]   12/6/2023  9:15 AM Inga Kim MD [facility] DERM [address]   1/9/2024 10:00 AM LAB, [place] [facility] [place]   1/11/2024  1:30 PM Aura Naidu MD [facility] [place]       Renee Bal MD  11/29/2023 9:36 AM    Primary Care Internal Medicine                     "

## 2023-12-04 ENCOUNTER — OFFICE VISIT (OUTPATIENT)
Dept: OBSTETRICS AND GYNECOLOGY | Facility: CLINIC | Age: 28
End: 2023-12-04
Payer: COMMERCIAL

## 2023-12-04 VITALS
DIASTOLIC BLOOD PRESSURE: 66 MMHG | SYSTOLIC BLOOD PRESSURE: 106 MMHG | WEIGHT: 132.06 LBS | BODY MASS INDEX: 20.73 KG/M2 | HEIGHT: 67 IN

## 2023-12-04 DIAGNOSIS — Z01.419 WOMEN'S ANNUAL ROUTINE GYNECOLOGICAL EXAMINATION: Primary | ICD-10-CM

## 2023-12-04 DIAGNOSIS — Z12.4 ENCOUNTER FOR PAPANICOLAOU SMEAR FOR CERVICAL CANCER SCREENING: ICD-10-CM

## 2023-12-04 PROCEDURE — 88141 PR  CYTOPATH CERV/VAG INTERPRET: ICD-10-PCS | Mod: ,,, | Performed by: PATHOLOGY

## 2023-12-04 PROCEDURE — 3078F DIAST BP <80 MM HG: CPT | Mod: CPTII,S$GLB,, | Performed by: NURSE PRACTITIONER

## 2023-12-04 PROCEDURE — 3074F PR MOST RECENT SYSTOLIC BLOOD PRESSURE < 130 MM HG: ICD-10-PCS | Mod: CPTII,S$GLB,, | Performed by: NURSE PRACTITIONER

## 2023-12-04 PROCEDURE — 88175 CYTOPATH C/V AUTO FLUID REDO: CPT | Performed by: PATHOLOGY

## 2023-12-04 PROCEDURE — 3078F PR MOST RECENT DIASTOLIC BLOOD PRESSURE < 80 MM HG: ICD-10-PCS | Mod: CPTII,S$GLB,, | Performed by: NURSE PRACTITIONER

## 2023-12-04 PROCEDURE — 3008F PR BODY MASS INDEX (BMI) DOCUMENTED: ICD-10-PCS | Mod: CPTII,S$GLB,, | Performed by: NURSE PRACTITIONER

## 2023-12-04 PROCEDURE — 3074F SYST BP LT 130 MM HG: CPT | Mod: CPTII,S$GLB,, | Performed by: NURSE PRACTITIONER

## 2023-12-04 PROCEDURE — 87624 HPV HI-RISK TYP POOLED RSLT: CPT | Performed by: NURSE PRACTITIONER

## 2023-12-04 PROCEDURE — 3008F BODY MASS INDEX DOCD: CPT | Mod: CPTII,S$GLB,, | Performed by: NURSE PRACTITIONER

## 2023-12-04 PROCEDURE — 99999 PR PBB SHADOW E&M-EST. PATIENT-LVL III: CPT | Mod: PBBFAC,,, | Performed by: NURSE PRACTITIONER

## 2023-12-04 PROCEDURE — 1159F PR MEDICATION LIST DOCUMENTED IN MEDICAL RECORD: ICD-10-PCS | Mod: CPTII,S$GLB,, | Performed by: NURSE PRACTITIONER

## 2023-12-04 PROCEDURE — 99395 PREV VISIT EST AGE 18-39: CPT | Mod: S$GLB,,, | Performed by: NURSE PRACTITIONER

## 2023-12-04 PROCEDURE — 1159F MED LIST DOCD IN RCRD: CPT | Mod: CPTII,S$GLB,, | Performed by: NURSE PRACTITIONER

## 2023-12-04 PROCEDURE — 99999 PR PBB SHADOW E&M-EST. PATIENT-LVL III: ICD-10-PCS | Mod: PBBFAC,,, | Performed by: NURSE PRACTITIONER

## 2023-12-04 PROCEDURE — 99395 PR PREVENTIVE VISIT,EST,18-39: ICD-10-PCS | Mod: S$GLB,,, | Performed by: NURSE PRACTITIONER

## 2023-12-04 PROCEDURE — 88141 CYTOPATH C/V INTERPRET: CPT | Mod: ,,, | Performed by: PATHOLOGY

## 2023-12-04 NOTE — PROGRESS NOTES
CC: Annual  HPI: Pt is a 28 y.o.  female who presents for routine annual exam. Her twins are 2 yrs old in day care in [facility].  is in anesthesia residency. She is not using  contraception.  has vasectomy scheduled for 1/24.  She does not want STD screening.  Last cycle was 10 days late. She has hyperthyroidism/ Graves disease- on meds- she is asymptomatic. She has undetectable TSH and endocrinologist recommends thyroidectomy. She wants to give more time.   The patient participates in regular exercise: yes.  The patient does not smoke.  Pt denies any domestic violence.      ROS:  GENERAL: Feeling well overall. Denies fever or chills.   SKIN: Denies rash or lesions.   HEAD: Denies head injury or headache.   NODES: Denies enlarged lymph nodes.   CHEST: Denies chest pain or shortness of breath.   CARDIOVASCULAR: Denies palpitations or left sided chest pain.   ABDOMEN: No abdominal pain, constipation, diarrhea, nausea, vomiting or rectal bleeding.   URINARY: No dysuria, hematuria, or burning on urination.  REPRODUCTIVE: See HPI.   BREASTS: Denies pain, lumps, or nipple discharge.   HEMATOLOGIC: No easy bruisability or excessive bleeding.   MUSCULOSKELETAL: Denies joint pain or swelling.   NEUROLOGIC: Denies syncope or weakness.   PSYCHIATRIC: Denies depression, anxiety or mood swings.    PE:   APPEARANCE: Well nourished, well developed, White female in no acute distress.  NODES: no cervical, supraclavicular, or inguinal lymphadenopathy  BREASTS: Symmetrical, no skin changes or visible lesions. No palpable masses, nipple discharge or adenopathy bilaterally.  ABDOMEN: Soft. No tenderness or masses. No distention. No hernias palpated. No CVA tenderness.  VULVA: No lesions. Normal external female genitalia.  URETHRAL MEATUS: Normal size and location, no lesions, no prolapse.  URETHRA: No masses, tenderness, or prolapse.  VAGINA: Moist. No lesions or lacerations noted. No abnormal discharge present. No odor  present.   CERVIX: No lesions or discharge. No cervical motion tenderness.   UTERUS: Normal size, regular shape, mobile, non-tender.  ADNEXA: No tenderness. No fullness or masses palpated in the adnexal regions.   ANUS PERINEUM: Normal.      Diagnosis:  1. Women's annual routine gynecological examination    2. Encounter for Papanicolaou smear for cervical cancer screening        Plan:   Pap   Pt to notify  clinic if cycles remain irregular and can obtain pelvic US for eval   Orders Placed This Encounter    Liquid-Based Pap Smear, Screening       Patient was counseled today on the new ACS guidelines for cervical cytology screening as well as the current recommendations for breast cancer screening. She was counseled to follow up with her PCP for other routine health maintenance. Counseling session lasted approximately 10 minutes, and all her questions were answered.    Follow-up with me in 1 year for routine exam    Irina Garcia, JOYCE

## 2023-12-06 ENCOUNTER — OFFICE VISIT (OUTPATIENT)
Dept: DERMATOLOGY | Facility: CLINIC | Age: 28
End: 2023-12-06
Payer: COMMERCIAL

## 2023-12-06 DIAGNOSIS — Z12.83 SCREENING EXAM FOR SKIN CANCER: Primary | ICD-10-CM

## 2023-12-06 DIAGNOSIS — L81.4 LENTIGO: ICD-10-CM

## 2023-12-06 DIAGNOSIS — L90.5 SCAR: ICD-10-CM

## 2023-12-06 DIAGNOSIS — D22.9 MULTIPLE BENIGN NEVI: ICD-10-CM

## 2023-12-06 PROCEDURE — 99999 PR PBB SHADOW E&M-EST. PATIENT-LVL II: CPT | Mod: PBBFAC,,, | Performed by: DERMATOLOGY

## 2023-12-06 PROCEDURE — 99999 PR PBB SHADOW E&M-EST. PATIENT-LVL II: ICD-10-PCS | Mod: PBBFAC,,, | Performed by: DERMATOLOGY

## 2023-12-06 PROCEDURE — 99203 PR OFFICE/OUTPT VISIT, NEW, LEVL III, 30-44 MIN: ICD-10-PCS | Mod: S$GLB,,, | Performed by: DERMATOLOGY

## 2023-12-06 PROCEDURE — 1159F MED LIST DOCD IN RCRD: CPT | Mod: CPTII,S$GLB,, | Performed by: DERMATOLOGY

## 2023-12-06 PROCEDURE — 1159F PR MEDICATION LIST DOCUMENTED IN MEDICAL RECORD: ICD-10-PCS | Mod: CPTII,S$GLB,, | Performed by: DERMATOLOGY

## 2023-12-06 PROCEDURE — 99203 OFFICE O/P NEW LOW 30 MIN: CPT | Mod: S$GLB,,, | Performed by: DERMATOLOGY

## 2023-12-06 NOTE — PROGRESS NOTES
Subjective:      Patient ID:  Magnolia Shen is a 28 y.o. female who presents for   Chief Complaint   Patient presents with    Skin Check     TBSE      Patient here for Total Body Skin Exam    Last seen by dermatologist: 12/2022    Yes - personal history of atypical moles removed  none - personal history of MM   none - family history of MM  none - childhood blistering sunburns  yes - tanning bed use  none - personal history of NMSC    Patient with specific complaint of lesion(s)  Location: R-foot   Duration: >1 yr   Symptoms: none   Relieving factors/Previous treatments: none     Patient with new complaint of lesion  Location: L-heel   Duration: 1 yr   Symptoms: none   Relieving factors/Previous treatments: none    Patient with new complaint of lesion  Location: scalp   Duration: >1 yrs   Symptoms: none   Relieving factors/Previous treatments: none             Review of Systems   Skin:  Positive for daily sunscreen use (makeup: face), activity-related sunscreen use and wears hat. Negative for recent sunburn.   Hematologic/Lymphatic: Does not bruise/bleed easily.       Objective:   Physical Exam   Constitutional: She appears well-developed and well-nourished. No distress.   Genitourinary:         Neurological: She is alert and oriented to person, place, and time. She is not disoriented.   Psychiatric: She has a normal mood and affect.   Skin:   Areas Examined (abnormalities noted in diagram):   Scalp / Hair Palpated and Inspected  Head / Face Inspection Performed  Neck Inspection Performed  Chest / Axilla Inspection Performed  Abdomen Inspection Performed  Genitals / Buttocks / Groin Inspection Performed  Back Inspection Performed  RUE Inspected  LUE Inspection Performed  RLE Inspected  LLE Inspection Performed  Nails and Digits Inspection Performed                     Diagram Legend     Erythematous scaling macule/papule c/w actinic keratosis       Vascular papule c/w angioma      Pigmented verrucoid  papule/plaque c/w seborrheic keratosis      Yellow umbilicated papule c/w sebaceous hyperplasia      Irregularly shaped tan macule c/w lentigo     1-2 mm smooth white papules consistent with Milia      Movable subcutaneous cyst with punctum c/w epidermal inclusion cyst      Subcutaneous movable cyst c/w pilar cyst      Firm pink to brown papule c/w dermatofibroma      Pedunculated fleshy papule(s) c/w skin tag(s)      Evenly pigmented macule c/w junctional nevus     Mildly variegated pigmented, slightly irregular-bordered macule c/w mildly atypical nevus      Flesh colored to evenly pigmented papule c/w intradermal nevus       Pink pearly papule/plaque c/w basal cell carcinoma      Erythematous hyperkeratotic cursted plaque c/w SCC      Surgical scar with no sign of skin cancer recurrence      Open and closed comedones      Inflammatory papules and pustules      Verrucoid papule consistent consistent with wart     Erythematous eczematous patches and plaques     Dystrophic onycholytic nail with subungual debris c/w onychomycosis     Umbilicated papule    Erythematous-base heme-crusted tan verrucoid plaque consistent with inflamed seborrheic keratosis     Erythematous Silvery Scaling Plaque c/w Psoriasis     See annotation      Assessment / Plan:        Screening exam for skin cancer  Patient instructed in importance in daily sun protection of at least spf 30. Sun avoidance and topical protection discussed.     Recommend  for daily use on face and neck.    Patient encouraged to wear hat for all outdoor exposure.     Also discussed sun protective clothing. TASC or [place] are good brands, UPF 50 or above. Recommend long sleeves when out in the sun.       Multiple benign nevi    TBSE body skin examination performed today including at least 12 points as noted in physical examination. No lesions suspicious for malignancy noted.  Reassurance provided.  Instructed patient to observe lesion(s) for changes and follow up in  clinic if changes are noted. Discussed ABCDE's of moles and brochure provided.      Scar    Previous scar examined without signs of reoccurrence of malignancy. Continue to monitor.       Lentigo    This is a benign hyperpigmented sun induced lesion. Recommend daily sun protection/avoidance and use of at least SPF 30, broad spectrum sunscreen (OTC drug) will reduce the number of new lesions. Treatment of these benign lesions are considered cosmetic.    Follow up in 1 year.           No follow-ups on file.

## 2023-12-14 LAB
FINAL PATHOLOGIC DIAGNOSIS: ABNORMAL
Lab: ABNORMAL

## 2023-12-15 ENCOUNTER — PATIENT MESSAGE (OUTPATIENT)
Dept: OBSTETRICS AND GYNECOLOGY | Facility: CLINIC | Age: 28
End: 2023-12-15
Payer: COMMERCIAL

## 2023-12-15 LAB
HPV HR 12 DNA SPEC QL NAA+PROBE: NEGATIVE
HPV16 AG SPEC QL: NEGATIVE
HPV18 DNA SPEC QL NAA+PROBE: NEGATIVE

## 2024-01-09 ENCOUNTER — LAB VISIT (OUTPATIENT)
Dept: LAB | Facility: HOSPITAL | Age: 29
End: 2024-01-09
Payer: COMMERCIAL

## 2024-01-09 DIAGNOSIS — Z00.00 WELLNESS EXAMINATION: ICD-10-CM

## 2024-01-09 DIAGNOSIS — E05.90 HYPERTHYROIDISM: ICD-10-CM

## 2024-01-09 LAB
ALBUMIN SERPL BCP-MCNC: 4.4 G/DL (ref 3.5–5.2)
ALP SERPL-CCNC: 99 U/L (ref 55–135)
ALT SERPL W/O P-5'-P-CCNC: 18 U/L (ref 10–44)
ANION GAP SERPL CALC-SCNC: 10 MMOL/L (ref 8–16)
AST SERPL-CCNC: 28 U/L (ref 10–40)
BILIRUB SERPL-MCNC: 0.8 MG/DL (ref 0.1–1)
BUN SERPL-MCNC: 23 MG/DL (ref 6–20)
CALCIUM SERPL-MCNC: 9.6 MG/DL (ref 8.7–10.5)
CHLORIDE SERPL-SCNC: 106 MMOL/L (ref 95–110)
CHOLEST SERPL-MCNC: 200 MG/DL (ref 120–199)
CHOLEST/HDLC SERPL: 3.6 {RATIO} (ref 2–5)
CO2 SERPL-SCNC: 22 MMOL/L (ref 23–29)
CREAT SERPL-MCNC: 0.8 MG/DL (ref 0.5–1.4)
ERYTHROCYTE [DISTWIDTH] IN BLOOD BY AUTOMATED COUNT: 12.7 % (ref 11.5–14.5)
EST. GFR  (NO RACE VARIABLE): >60 ML/MIN/1.73 M^2
GLUCOSE SERPL-MCNC: 71 MG/DL (ref 70–110)
HCT VFR BLD AUTO: 44.5 % (ref 37–48.5)
HDLC SERPL-MCNC: 56 MG/DL (ref 40–75)
HDLC SERPL: 28 % (ref 20–50)
HGB BLD-MCNC: 14.5 G/DL (ref 12–16)
LDLC SERPL CALC-MCNC: 128.2 MG/DL (ref 63–159)
MCH RBC QN AUTO: 30.5 PG (ref 27–31)
MCHC RBC AUTO-ENTMCNC: 32.6 G/DL (ref 32–36)
MCV RBC AUTO: 94 FL (ref 82–98)
NONHDLC SERPL-MCNC: 144 MG/DL
PLATELET # BLD AUTO: 240 K/UL (ref 150–450)
PMV BLD AUTO: 10.3 FL (ref 9.2–12.9)
POTASSIUM SERPL-SCNC: 4.3 MMOL/L (ref 3.5–5.1)
PROT SERPL-MCNC: 7.3 G/DL (ref 6–8.4)
RBC # BLD AUTO: 4.75 M/UL (ref 4–5.4)
SODIUM SERPL-SCNC: 138 MMOL/L (ref 136–145)
T4 FREE SERPL-MCNC: 1.22 NG/DL (ref 0.71–1.51)
TRIGL SERPL-MCNC: 79 MG/DL (ref 30–150)
TSH SERPL DL<=0.005 MIU/L-ACNC: 0.01 UIU/ML (ref 0.4–4)
WBC # BLD AUTO: 4.9 K/UL (ref 3.9–12.7)

## 2024-01-09 PROCEDURE — 84443 ASSAY THYROID STIM HORMONE: CPT | Performed by: INTERNAL MEDICINE

## 2024-01-09 PROCEDURE — 84439 ASSAY OF FREE THYROXINE: CPT | Performed by: INTERNAL MEDICINE

## 2024-01-09 PROCEDURE — 36415 COLL VENOUS BLD VENIPUNCTURE: CPT | Mod: PO | Performed by: INTERNAL MEDICINE

## 2024-01-09 PROCEDURE — 80061 LIPID PANEL: CPT | Performed by: INTERNAL MEDICINE

## 2024-01-09 PROCEDURE — 85027 COMPLETE CBC AUTOMATED: CPT | Performed by: INTERNAL MEDICINE

## 2024-01-09 PROCEDURE — 80053 COMPREHEN METABOLIC PANEL: CPT | Performed by: INTERNAL MEDICINE

## 2024-01-10 DIAGNOSIS — E05.90 HYPERTHYROIDISM: Primary | ICD-10-CM

## 2024-03-25 ENCOUNTER — OFFICE VISIT (OUTPATIENT)
Dept: PRIMARY CARE CLINIC | Facility: CLINIC | Age: 29
End: 2024-03-25
Payer: COMMERCIAL

## 2024-03-25 VITALS
OXYGEN SATURATION: 98 % | BODY MASS INDEX: 21.31 KG/M2 | DIASTOLIC BLOOD PRESSURE: 62 MMHG | HEART RATE: 65 BPM | SYSTOLIC BLOOD PRESSURE: 112 MMHG | HEIGHT: 67 IN | WEIGHT: 135.81 LBS

## 2024-03-25 DIAGNOSIS — R41.840 INATTENTION: Primary | ICD-10-CM

## 2024-03-25 DIAGNOSIS — E05.00 GRAVES DISEASE: ICD-10-CM

## 2024-03-25 PROCEDURE — 3078F DIAST BP <80 MM HG: CPT | Mod: CPTII,S$GLB,, | Performed by: INTERNAL MEDICINE

## 2024-03-25 PROCEDURE — 3074F SYST BP LT 130 MM HG: CPT | Mod: CPTII,S$GLB,, | Performed by: INTERNAL MEDICINE

## 2024-03-25 PROCEDURE — 3008F BODY MASS INDEX DOCD: CPT | Mod: CPTII,S$GLB,, | Performed by: INTERNAL MEDICINE

## 2024-03-25 PROCEDURE — 99999 PR PBB SHADOW E&M-EST. PATIENT-LVL IV: CPT | Mod: PBBFAC,,, | Performed by: INTERNAL MEDICINE

## 2024-03-25 PROCEDURE — 1159F MED LIST DOCD IN RCRD: CPT | Mod: CPTII,S$GLB,, | Performed by: INTERNAL MEDICINE

## 2024-03-25 PROCEDURE — 99214 OFFICE O/P EST MOD 30 MIN: CPT | Mod: S$GLB,,, | Performed by: INTERNAL MEDICINE

## 2024-03-25 RX ORDER — ATOMOXETINE 40 MG/1
40 CAPSULE ORAL DAILY
Qty: 30 CAPSULE | Refills: 0 | Status: SHIPPED | OUTPATIENT
Start: 2024-03-25 | End: 2024-04-24

## 2024-03-25 NOTE — PROGRESS NOTES
"[facility] Note    Chief Complaint      Chief Complaint   Patient presents with    psych testing      Would liek ADD/ADHD testing referral      History of Present Illness      Magnolia Shen is a 28 y.o. female who presents today for chief complaint inattention.      HPI   Last seen 11.23 annual   She feels well  Graves hyperthyroid last TSH suppressed but T4 normal  MI 5 mg qd,  Active Problem List with Overview Notes    Diagnosis Date Noted    Hyperthyroidism 07/11/2023    Graves disease 07/10/2023       Health Maintenance   Topic Date Due    TETANUS VACCINE  Never done    Pap Smear  12/04/2026    Hepatitis C Screening  Completed    Lipid Panel  Completed       History reviewed. No pertinent past medical history.    History reviewed. No pertinent surgical history.    family history is not on file.    Social History     Tobacco Use    Smoking status: Never    Smokeless tobacco: Never   Substance Use Topics    Alcohol use: Yes     Alcohol/week: 1.0 standard drink of alcohol     Types: 1 Glasses of wine per week    Drug use: Never       ROS     Outpatient Encounter Medications as of 3/25/2024   Medication Sig Dispense Refill    methIMAzole (TAPAZOLE) 5 MG Tab Take 1 tablet (5 mg total) by mouth once daily. 30 tablet 2    atomoxetine (STRATTERA) 40 MG capsule Take 1 capsule (40 mg total) by mouth once daily. 30 capsule 0     No facility-administered encounter medications on file as of 3/25/2024.        Review of patient's allergies indicates:  No Known Allergies        Physical Exam      Vital Signs  Pulse: 65  SpO2: 98 %  BP: 112/62  BP Location: Right arm  Patient Position: Sitting  Height and Weight  Height: 5' 7" (170.2 cm)  Weight: 61.6 kg (135 lb 12.9 oz)  BSA (Calculated - sq m): 1.71 sq meters  BMI (Calculated): 21.3  Weight in (lb) to have BMI = 25: 159.3]    Physical Exam     Laboratory:  CBC:  Recent Labs   Lab Result Units 01/09/24  0958   WBC K/uL 4.90   RBC M/uL 4.75   Hemoglobin " "g/dL 14.5   Hematocrit % 44.5   Platelets K/uL 240   MCV fL 94   MCH pg 30.5   MCHC g/dL 32.6     CMP:  Recent Labs   Lab Result Units 01/09/24  0958   Glucose mg/dL 71   Calcium mg/dL 9.6   Albumin g/dL 4.4   Total Protein g/dL 7.3   Sodium mmol/L 138   Potassium mmol/L 4.3   CO2 mmol/L 22*   Chloride mmol/L 106   BUN mg/dL 23*   Alkaline Phosphatase U/L 99   ALT U/L 18   AST U/L 28   Total Bilirubin mg/dL 0.8     URINALYSIS:  No results for input(s): "COLORU", "CLARITYU", "SPECGRAV", "PHUR", "PROTEINUA", "GLUCOSEU", "BILIRUBINCON", "BLOODU", "WBCU", "RBCU", "BACTERIA", "MUCUS", "NITRITE", "LEUKOCYTESUR", "UROBILINOGEN", "HYALINECASTS" in the last 2160 hours.   LIPIDS:  Recent Labs   Lab Result Units 01/09/24  0958   TSH uIU/mL 0.011*   HDL mg/dL 56   Cholesterol mg/dL 200*   Triglycerides mg/dL 79   LDL Cholesterol mg/dL 128.2   HDL/Cholesterol Ratio % 28.0   Non-HDL Cholesterol mg/dL 144   Total Cholesterol/HDL Ratio  3.6     TSH:  Recent Labs   Lab Result Units 01/09/24  0958   TSH uIU/mL 0.011*     A1C:  No results for input(s): "HGBA1C" in the last 2160 hours.        Assessment/Plan     Magnolia Shen is a 28 y.o.female with:    1. Inattention  -     Ambulatory referral/consult to Psychiatry; Future; Expected date: 04/01/2024  -     atomoxetine (STRATTERA) 40 MG capsule; Take 1 capsule (40 mg total) by mouth once daily.  Dispense: 30 capsule; Refill: 0  -will seek out eval, non stimulant trial in the interim     2. Graves disease  Assessment & Plan:  Per endo           Use of the [facility] Patient Portal discussed and encouraged during today's visit  -Continue current medications and maintain follow up with specialists.  R  Future Appointments   Date Time Provider Department Center   4/10/2024  9:00 AM LAB, [facility] [place]   5/16/2024  8:00 AM Julia Valdovinos MD [facility]       Renee Bal MD  3/25/2024 10:11 AM    Primary Care Internal Medicine                     "

## 2024-04-10 ENCOUNTER — LAB VISIT (OUTPATIENT)
Dept: LAB | Facility: HOSPITAL | Age: 29
End: 2024-04-10
Payer: COMMERCIAL

## 2024-04-10 DIAGNOSIS — E05.90 HYPERTHYROIDISM: ICD-10-CM

## 2024-04-10 LAB
T4 FREE SERPL-MCNC: 1 NG/DL (ref 0.71–1.51)
TSH SERPL DL<=0.005 MIU/L-ACNC: 0.11 UIU/ML (ref 0.4–4)

## 2024-04-10 PROCEDURE — 36415 COLL VENOUS BLD VENIPUNCTURE: CPT | Mod: PO | Performed by: INTERNAL MEDICINE

## 2024-04-10 PROCEDURE — 84443 ASSAY THYROID STIM HORMONE: CPT | Performed by: INTERNAL MEDICINE

## 2024-04-10 PROCEDURE — 84439 ASSAY OF FREE THYROXINE: CPT | Performed by: INTERNAL MEDICINE

## 2024-04-24 DIAGNOSIS — E05.90 HYPERTHYROIDISM: ICD-10-CM

## 2024-04-24 NOTE — TELEPHONE ENCOUNTER
No care due was identified.  [facility] Embedded Care Due Messages. Reference number: [id number].   4/24/2024 9:45:02 AM CDT

## 2024-04-25 RX ORDER — METHIMAZOLE 5 MG/1
5 TABLET ORAL DAILY
Qty: 30 TABLET | Refills: 2 | Status: SHIPPED | OUTPATIENT
Start: 2024-04-25 | End: 2024-05-16 | Stop reason: SDUPTHER

## 2024-05-16 ENCOUNTER — OFFICE VISIT (OUTPATIENT)
Dept: ENDOCRINOLOGY | Facility: CLINIC | Age: 29
End: 2024-05-16
Payer: COMMERCIAL

## 2024-05-16 DIAGNOSIS — E05.90 HYPERTHYROIDISM: ICD-10-CM

## 2024-05-16 DIAGNOSIS — E05.00 GRAVES DISEASE: Primary | ICD-10-CM

## 2024-05-16 PROCEDURE — 1159F MED LIST DOCD IN RCRD: CPT | Mod: CPTII,95,, | Performed by: INTERNAL MEDICINE

## 2024-05-16 PROCEDURE — G2211 COMPLEX E/M VISIT ADD ON: HCPCS | Mod: 95,,, | Performed by: INTERNAL MEDICINE

## 2024-05-16 PROCEDURE — 1160F RVW MEDS BY RX/DR IN RCRD: CPT | Mod: CPTII,95,, | Performed by: INTERNAL MEDICINE

## 2024-05-16 PROCEDURE — 99214 OFFICE O/P EST MOD 30 MIN: CPT | Mod: 95,,, | Performed by: INTERNAL MEDICINE

## 2024-05-16 RX ORDER — METHIMAZOLE 5 MG/1
5 TABLET ORAL DAILY
Qty: 90 TABLET | Refills: 3 | Status: SHIPPED | OUTPATIENT
Start: 2024-05-16 | End: 2025-05-16

## 2024-05-16 NOTE — PROGRESS NOTES
The patient location is: [place]  The chief complaint leading to consultation is:   Chief Complaint   Patient presents with    Graves' Disease       Visit type: audiovisual    Face to Face time with patient: 15 minutes  20 minutes of total time spent on the encounter, which includes face to face time and non-face to face time preparing to see the patient (eg, review of tests), Obtaining and/or reviewing separately obtained history, Documenting clinical information in the electronic or other health record, Independently interpreting results (not separately reported) and communicating results to the patient/family/caregiver, or Care coordination (not separately reported).    Each patient to whom he or she provides medical services by telemedicine is:  (1) informed of the relationship between the physician and patient and the respective role of any other health care provider with respect to management of the patient; and (2) notified that he or she may decline to receive medical services by telemedicine and may withdraw from such care at any time.    Previously seen by Dr. Naidu, new to me      Magnolia Shen is a 29 y.o. female who presents for follow up of Grave's disease    Dx with Grave's disease on 12/2022 due to symptoms of palpitations (SVT), heat intolerance. She denies weight loss.   TRAB/ TSI ++     Pt was started on methimazole 5 mg daily on 12/2022. Her dose was decreased to 2.5 mg daily in 3/2023 as she reported symptoms of fatigue, constipation and weight gain on the 5 mg dose.      Her  FT4 and T3 are WNL since 2/2023 but TSH remains suppressed.   She is hesitant to increase dose due to fatigue and weight gain  Thyroidectomy recommended and she did not wish to move forward with this    Eye disease:  No evidence of active disease   CBC and LFT: WNL in 3/2023  Family history of Thyroid disease: Grandmother and Aunt have hypothyroidism.        Thyroid US: 12/2022:   FINDINGS:  The thyroid is normal in size.  Right  "lobe of the thyroid measures 4.4 x 1.7 x 2.0 cm.  Left lobe of the thyroid measures 4.4 x 1.8 x 1.5 cm.  AP isthmus is 0.2 cm in thickness.  Diffuse hypervascular thyroid parenchyma.  0.5 x 0.3 x 0.3 cm left midpole hypoechoic solid nodule (TR 4).  No nodules meeting criteria for follow-up or FNA.     No cervical lymphadenopathy.     Impression:  Hypervascular thyroid parenchyma which can be seen with sequela of thyroiditis.     Patient has twins.   No plans any pregnancy,  vasectomy  Denies neck pain or tenderness.     GM and aunt with hypothyroidism    Lab Results   Component Value Date    TSH 0.112 (L) 04/10/2024    TSH 0.011 (L) 01/09/2024    TSH <0.010 (L) 10/26/2023    TSH <0.010 (L) 09/05/2023    TSH <0.010 (L) 06/27/2023    TSH <0.010 (L) 05/09/2023    TSH <0.010 (L) 03/13/2023    TSH <0.010 (L) 02/13/2023    TSH <0.010 (L) 01/09/2023    TSH <0.010 (L) 11/15/2022    FREET4 1.00 04/10/2024    FREET4 1.22 01/09/2024    FREET4 1.20 10/26/2023    FREET4 1.21 09/05/2023    FREET4 1.36 06/27/2023    FREET4 1.31 05/09/2023    FREET4 0.95 03/13/2023    FREET4 1.06 02/13/2023    FREET4 1.72 (H) 01/09/2023    FREET4 1.67 (H) 11/15/2022        ROS:   As above    Objective:     There were no vitals taken for this visit.    There is no height or weight on file to calculate BMI.          Lab Review:   No results found for: "HGBA1C"  Lab Results   Component Value Date    CHOL 200 (H) 01/09/2024    HDL 56 01/09/2024    LDLCALC 128.2 01/09/2024    TRIG 79 01/09/2024    CHOLHDL 28.0 01/09/2024     Lab Results   Component Value Date     01/09/2024    K 4.3 01/09/2024     01/09/2024    CO2 22 (L) 01/09/2024    GLU 71 01/09/2024    BUN 23 (H) 01/09/2024    CREATININE 0.8 01/09/2024    CALCIUM 9.6 01/09/2024    PROT 7.3 01/09/2024    ALBUMIN 4.4 01/09/2024    BILITOT 0.8 01/09/2024    ALKPHOS 99 01/09/2024    AST 28 01/09/2024    ALT 18 01/09/2024    ANIONGAP 10 01/09/2024    TSH 0.112 (L) 04/10/2024     No " "results found for: "[id number]"    Assessment and Plan     Graves disease  TSH remains suppressed after 1 year of treatment with methimazole  Discussed indicates she needs higher dose but she is hesitant to increase due to concern for fatigue, weight gain. Reviewed that symptoms are not primary factor used to guide dosing but ok to make slow titrations with goal to raise TSH  Repeat T3 with next labs along with TRAB  Discussed possibility of remission but would need to normalize thyroid function first  No plans for pregnancy,  with vascetomy      Julia Valdovinos MD      RTC 6 months    Visit today included increased complexity associated with the care of the problems addressed and managing the longitudinal care of the patient due to the serious and/or complex managed problems    "

## 2024-05-16 NOTE — ASSESSMENT & PLAN NOTE
TSH remains suppressed after 1 year of treatment with methimazole  Discussed indicates she needs higher dose but she is hesitant to increase due to concern for fatigue, weight gain. Reviewed that symptoms are not primary factor used to guide dosing but ok to make slow titrations with goal to raise TSH  Repeat T3 with next labs along with TRAB  Discussed possibility of remission but would need to normalize thyroid function first  No plans for pregnancy,  with vascetomy

## 2024-05-31 ENCOUNTER — LAB VISIT (OUTPATIENT)
Dept: LAB | Facility: HOSPITAL | Age: 29
End: 2024-05-31
Attending: INTERNAL MEDICINE
Payer: COMMERCIAL

## 2024-05-31 DIAGNOSIS — E05.90 HYPERTHYROIDISM: ICD-10-CM

## 2024-05-31 LAB
T3 SERPL-MCNC: 73 NG/DL (ref 60–180)
T4 FREE SERPL-MCNC: 0.88 NG/DL (ref 0.71–1.51)
TSH SERPL DL<=0.005 MIU/L-ACNC: 0.36 UIU/ML (ref 0.4–4)

## 2024-05-31 PROCEDURE — 84480 ASSAY TRIIODOTHYRONINE (T3): CPT | Performed by: INTERNAL MEDICINE

## 2024-05-31 PROCEDURE — 36415 COLL VENOUS BLD VENIPUNCTURE: CPT | Mod: PO | Performed by: INTERNAL MEDICINE

## 2024-05-31 PROCEDURE — 83520 IMMUNOASSAY QUANT NOS NONAB: CPT | Performed by: INTERNAL MEDICINE

## 2024-05-31 PROCEDURE — 84439 ASSAY OF FREE THYROXINE: CPT | Performed by: INTERNAL MEDICINE

## 2024-05-31 PROCEDURE — 84443 ASSAY THYROID STIM HORMONE: CPT | Performed by: INTERNAL MEDICINE

## 2024-06-03 ENCOUNTER — PATIENT MESSAGE (OUTPATIENT)
Dept: ENDOCRINOLOGY | Facility: CLINIC | Age: 29
End: 2024-06-03
Payer: COMMERCIAL

## 2024-06-03 DIAGNOSIS — E05.00 GRAVES DISEASE: Primary | ICD-10-CM

## 2024-06-03 LAB — TSH RECEP AB SER-ACNC: 23 IU/L (ref 0–1.75)

## 2024-07-12 ENCOUNTER — OFFICE VISIT (OUTPATIENT)
Dept: DERMATOLOGY | Facility: CLINIC | Age: 29
End: 2024-07-12
Payer: COMMERCIAL

## 2024-07-12 VITALS — BODY MASS INDEX: 21.14 KG/M2 | WEIGHT: 135 LBS

## 2024-07-12 DIAGNOSIS — L81.4 LENTIGINES: ICD-10-CM

## 2024-07-12 DIAGNOSIS — L73.8 SEBACEOUS GLAND HYPERPLASIA: ICD-10-CM

## 2024-07-12 DIAGNOSIS — D22.9 NEVUS OF MULTIPLE SITES: Primary | ICD-10-CM

## 2024-07-12 PROCEDURE — 99999 PR PBB SHADOW E&M-EST. PATIENT-LVL III: CPT | Mod: PBBFAC,,, | Performed by: DERMATOLOGY

## 2024-07-12 NOTE — PROGRESS NOTES
Subjective:      Patient ID:  Magnolia Shen is a 29 y.o. female who presents for   Chief Complaint   Patient presents with    Spot     Left cheek     She has noticed a new brown spot on her left cheek.     Spot - Initial  Affected locations: face  Signs / symptoms: asymptomatic  Aggravated by: nothing  Relieving factors/Treatments tried: nothing      Review of Systems   Constitutional:  Negative for fever, chills, weight loss, weight gain, fatigue, night sweats and malaise.   Skin:  Positive for daily sunscreen use and activity-related sunscreen use. Negative for wears hat.       Objective:   Physical Exam   Constitutional: She appears well-developed and well-nourished.   Neurological: She is alert and oriented to person, place, and time.   Psychiatric: She has a normal mood and affect.   Skin:   Areas Examined (abnormalities noted in diagram):   Head / Face Inspection Performed  RUE Inspected  LUE Inspection Performed  RLE Inspected  LLE Inspection Performed                 Diagram Legend     Erythematous scaling macule/papule c/w actinic keratosis       Vascular papule c/w angioma      Pigmented verrucoid papule/plaque c/w seborrheic keratosis      Yellow umbilicated papule c/w sebaceous hyperplasia      Irregularly shaped tan macule c/w lentigo     1-2 mm smooth white papules consistent with Milia      Movable subcutaneous cyst with punctum c/w epidermal inclusion cyst      Subcutaneous movable cyst c/w pilar cyst      Firm pink to brown papule c/w dermatofibroma      Pedunculated fleshy papule(s) c/w skin tag(s)      Evenly pigmented macule c/w junctional nevus     Mildly variegated pigmented, slightly irregular-bordered macule c/w mildly atypical nevus      Flesh colored to evenly pigmented papule c/w intradermal nevus       Pink pearly papule/plaque c/w basal cell carcinoma      Erythematous hyperkeratotic cursted plaque c/w SCC      Surgical scar with no sign of skin cancer recurrence      Open and  "closed comedones      Inflammatory papules and pustules      Verrucoid papule consistent consistent with wart     Erythematous eczematous patches and plaques     Dystrophic onycholytic nail with subungual debris c/w onychomycosis     Umbilicated papule    Erythematous-base heme-crusted tan verrucoid plaque consistent with inflamed seborrheic keratosis     Erythematous Silvery Scaling Plaque c/w Psoriasis     See annotation      Assessment / Plan:        Nevus of multiple sites  The "ABCD" rules to observe pigmented lesions were reviewed.      Lentigines  The "ABCD" rules to observe pigmented lesions were reviewed.   No lesions suspicious for malignancy noted.    Recommend daily sun protection/avoidance and use of at least SPF 30, broad spectrum sunscreen (OTC drug).       Sebaceous gland hyperplasia  reassurance  Retinol hs              Follow up in about 1 year (around 7/12/2025).  "

## 2024-09-06 ENCOUNTER — LAB VISIT (OUTPATIENT)
Dept: LAB | Facility: HOSPITAL | Age: 29
End: 2024-09-06
Attending: INTERNAL MEDICINE
Payer: COMMERCIAL

## 2024-09-06 DIAGNOSIS — E05.00 GRAVES DISEASE: ICD-10-CM

## 2024-09-06 LAB — TSH SERPL DL<=0.005 MIU/L-ACNC: 0.66 UIU/ML (ref 0.4–4)

## 2024-09-06 PROCEDURE — 84443 ASSAY THYROID STIM HORMONE: CPT | Performed by: INTERNAL MEDICINE

## 2024-09-06 PROCEDURE — 36415 COLL VENOUS BLD VENIPUNCTURE: CPT | Mod: PO | Performed by: INTERNAL MEDICINE

## 2024-10-25 ENCOUNTER — PATIENT MESSAGE (OUTPATIENT)
Dept: ENDOCRINOLOGY | Facility: CLINIC | Age: 29
End: 2024-10-25
Payer: COMMERCIAL

## 2024-12-09 ENCOUNTER — OFFICE VISIT (OUTPATIENT)
Dept: OBSTETRICS AND GYNECOLOGY | Facility: CLINIC | Age: 29
End: 2024-12-09
Payer: COMMERCIAL

## 2024-12-09 VITALS
WEIGHT: 130.5 LBS | BODY MASS INDEX: 20.48 KG/M2 | DIASTOLIC BLOOD PRESSURE: 70 MMHG | HEIGHT: 67 IN | SYSTOLIC BLOOD PRESSURE: 108 MMHG

## 2024-12-09 DIAGNOSIS — Z01.419 WOMEN'S ANNUAL ROUTINE GYNECOLOGICAL EXAMINATION: Primary | ICD-10-CM

## 2024-12-09 DIAGNOSIS — Z12.4 ENCOUNTER FOR PAPANICOLAOU SMEAR FOR CERVICAL CANCER SCREENING: ICD-10-CM

## 2024-12-09 PROCEDURE — 88175 CYTOPATH C/V AUTO FLUID REDO: CPT | Performed by: NURSE PRACTITIONER

## 2024-12-09 PROCEDURE — 3008F BODY MASS INDEX DOCD: CPT | Mod: CPTII,S$GLB,, | Performed by: NURSE PRACTITIONER

## 2024-12-09 PROCEDURE — 1159F MED LIST DOCD IN RCRD: CPT | Mod: CPTII,S$GLB,, | Performed by: NURSE PRACTITIONER

## 2024-12-09 PROCEDURE — 3074F SYST BP LT 130 MM HG: CPT | Mod: CPTII,S$GLB,, | Performed by: NURSE PRACTITIONER

## 2024-12-09 PROCEDURE — 99999 PR PBB SHADOW E&M-EST. PATIENT-LVL III: CPT | Mod: PBBFAC,,, | Performed by: NURSE PRACTITIONER

## 2024-12-09 PROCEDURE — 99395 PREV VISIT EST AGE 18-39: CPT | Mod: S$GLB,,, | Performed by: NURSE PRACTITIONER

## 2024-12-09 PROCEDURE — 3078F DIAST BP <80 MM HG: CPT | Mod: CPTII,S$GLB,, | Performed by: NURSE PRACTITIONER

## 2024-12-09 NOTE — PROGRESS NOTES
CC: Annual  HPI: Pt is a 29 y.o.  female who presents for routine annual exam.  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 Twins will be in  next year. She uses vasectomy for contraception. She does not want STD screening.  Denies any GYN complaints. Graves disease well controlled with Methimazole.   The patient participates in regular exercise: yes.  The patient does not smoke.   Pt denies any domestic violence.    ROS:  GENERAL: Feeling well overall. Denies fever or chills.   SKIN: Denies rash or lesions.   HEAD: Denies head injury or headache.   NODES: Denies enlarged lymph nodes.   CHEST: Denies chest pain or shortness of breath.   CARDIOVASCULAR: Denies palpitations or left sided chest pain.   ABDOMEN: No abdominal pain, constipation, diarrhea, nausea, vomiting or rectal bleeding.   URINARY: No dysuria, hematuria, or burning on urination.  REPRODUCTIVE: See HPI.   BREASTS: Denies pain, lumps, or nipple discharge.   HEMATOLOGIC: No easy bruisability or excessive bleeding.   MUSCULOSKELETAL: Denies joint pain or swelling.   NEUROLOGIC: Denies syncope or weakness.   PSYCHIATRIC: Denies depression, anxiety or mood swings.    PE:   APPEARANCE: Well nourished, well developed, White female in no acute distress.  NODES: no cervical, supraclavicular, or inguinal lymphadenopathy  BREASTS: Symmetrical, no skin changes or visible lesions. No palpable masses, nipple discharge or adenopathy bilaterally.  ABDOMEN: Soft. No tenderness or masses. No distention. No hernias palpated. No CVA tenderness.  VULVA: No lesions. Normal external female genitalia.  URETHRAL MEATUS: Normal size and location, no lesions, no prolapse.  URETHRA: No masses, tenderness, or prolapse.  VAGINA: Moist. No lesions or lacerations noted. No abnormal discharge present. No odor present.   CERVIX: No lesions or discharge. No cervical motion tenderness.   UTERUS: Normal size, regular shape, mobile,  non-tender.  ADNEXA: No tenderness. No fullness or masses palpated in the adnexal regions.   ANUS PERINEUM: Normal.      Diagnosis:  1. Women's annual routine gynecological examination    2. Encounter for Papanicolaou smear for cervical cancer screening        Plan:   Pap    Orders Placed This Encounter    Liquid-Based Pap Smear, Screening       Patient was counseled today on the new ACS guidelines for cervical cytology screening as well as the current recommendations for breast cancer screening. She was counseled to follow up with her PCP for other routine health maintenance. Counseling session lasted approximately 10 minutes, and all her questions were answered.    Follow-up with me in 1 year for routine exam    Irina Garcia, JOYCE

## 2024-12-13 LAB
FINAL PATHOLOGIC DIAGNOSIS: NORMAL
Lab: NORMAL

## 2025-01-08 ENCOUNTER — OFFICE VISIT (OUTPATIENT)
Dept: OBSTETRICS AND GYNECOLOGY | Facility: CLINIC | Age: 30
End: 2025-01-08
Payer: COMMERCIAL

## 2025-01-08 VITALS
BODY MASS INDEX: 20.83 KG/M2 | HEIGHT: 67 IN | WEIGHT: 132.69 LBS | DIASTOLIC BLOOD PRESSURE: 62 MMHG | SYSTOLIC BLOOD PRESSURE: 110 MMHG

## 2025-01-08 DIAGNOSIS — R10.2 PELVIC PAIN: Primary | ICD-10-CM

## 2025-01-08 LAB
B-HCG UR QL: NEGATIVE
CTP QC/QA: YES

## 2025-01-08 PROCEDURE — 99213 OFFICE O/P EST LOW 20 MIN: CPT | Mod: S$GLB,,, | Performed by: NURSE PRACTITIONER

## 2025-01-08 PROCEDURE — 3074F SYST BP LT 130 MM HG: CPT | Mod: CPTII,S$GLB,, | Performed by: NURSE PRACTITIONER

## 2025-01-08 PROCEDURE — 1159F MED LIST DOCD IN RCRD: CPT | Mod: CPTII,S$GLB,, | Performed by: NURSE PRACTITIONER

## 2025-01-08 PROCEDURE — 3008F BODY MASS INDEX DOCD: CPT | Mod: CPTII,S$GLB,, | Performed by: NURSE PRACTITIONER

## 2025-01-08 PROCEDURE — 81025 URINE PREGNANCY TEST: CPT | Mod: S$GLB,,, | Performed by: NURSE PRACTITIONER

## 2025-01-08 PROCEDURE — 99999 PR PBB SHADOW E&M-EST. PATIENT-LVL III: CPT | Mod: PBBFAC,,, | Performed by: NURSE PRACTITIONER

## 2025-01-08 PROCEDURE — 3078F DIAST BP <80 MM HG: CPT | Mod: CPTII,S$GLB,, | Performed by: NURSE PRACTITIONER

## 2025-01-08 NOTE — PROGRESS NOTES
CC: Pelvic pain    Pt is 29 y.o. here for evaluation of pelvic pain. The pain is described as sharp and stabbing for 1 week in LLQ - now just a dull ache/ discomfort.  Pain is located in the LLQ. Onset was sudden occurring 1 week ago and coincided with ovualtion. Symptoms have been gradually improving since. Aggravating factors: none. Alleviating factors: NSAIDs. Associated symptoms: none. The patient denies constipation, dysuria, and fever. Risk factors for pelvic/abdominal pain include none. UPT is negative.       ROS:  GENERAL: Feeling well overall. Denies fever or chills.   SKIN: Denies rash or lesions.   HEAD: Denies head injury or headache.   NODES: Denies enlarged lymph nodes.   CHEST: Denies chest pain or shortness of breath.   CARDIOVASCULAR: Denies palpitations or left sided chest pain.   ABDOMEN: No abdominal pain, constipation, diarrhea, nausea, vomiting or rectal bleeding.   URINARY: No dysuria, hematuria, or burning on urination.  REPRODUCTIVE: See HPI.   BREASTS: Denies pain, lumps, or nipple discharge.   HEMATOLOGIC: No easy bruisability or excessive bleeding.   MUSCULOSKELETAL: Denies joint pain or swelling.   NEUROLOGIC: Denies syncope or weakness.   PSYCHIATRIC: Denies depression, anxiety or mood swings.    PE:   APPEARANCE: Well nourished, well developed, female in no acute distress.  VULVA: No lesions. Normal external female genitalia.  URETHRAL MEATUS: Normal size and location, no lesions, no prolapse.  URETHRA: No masses, tenderness, or prolapse.  VAGINA: Moist. No lesions or lacerations noted. No abnormal discharge present. No odor present.   CERVIX: No lesions or discharge. No cervical motion tenderness.   UTERUS: Normal size, regular shape, mobile, non-tender.  ADNEXA: No tenderness. No fullness or masses palpated in the adnexal regions.   ANUS PERINEUM: Normal.        Diagnosis:  1. Pelvic pain        Plan:   UPT is negative   Pelvic US for eval of the pain   Orders Placed This Encounter     US Pelvis Comp with Transvag NON-OB (xpd    POCT Urine Pregnancy         Follow-up PRN no resolution of symptoms.      Irina Garcia, CUCAP-C

## 2025-01-09 ENCOUNTER — OFFICE VISIT (OUTPATIENT)
Dept: INTERNAL MEDICINE | Facility: CLINIC | Age: 30
End: 2025-01-09
Payer: COMMERCIAL

## 2025-01-09 VITALS
WEIGHT: 130 LBS | OXYGEN SATURATION: 98 % | BODY MASS INDEX: 20.4 KG/M2 | DIASTOLIC BLOOD PRESSURE: 68 MMHG | HEIGHT: 67 IN | HEART RATE: 55 BPM | RESPIRATION RATE: 16 BRPM | TEMPERATURE: 98 F | SYSTOLIC BLOOD PRESSURE: 114 MMHG

## 2025-01-09 DIAGNOSIS — Z00.00 PHYSICAL EXAM, ANNUAL: Primary | ICD-10-CM

## 2025-01-09 PROCEDURE — 99999 PR PBB SHADOW E&M-EST. PATIENT-LVL III: CPT | Mod: PBBFAC,,, | Performed by: STUDENT IN AN ORGANIZED HEALTH CARE EDUCATION/TRAINING PROGRAM

## 2025-01-09 PROCEDURE — 3078F DIAST BP <80 MM HG: CPT | Mod: CPTII,S$GLB,, | Performed by: STUDENT IN AN ORGANIZED HEALTH CARE EDUCATION/TRAINING PROGRAM

## 2025-01-09 PROCEDURE — 99395 PREV VISIT EST AGE 18-39: CPT | Mod: S$GLB,,, | Performed by: STUDENT IN AN ORGANIZED HEALTH CARE EDUCATION/TRAINING PROGRAM

## 2025-01-09 PROCEDURE — 1159F MED LIST DOCD IN RCRD: CPT | Mod: CPTII,S$GLB,, | Performed by: STUDENT IN AN ORGANIZED HEALTH CARE EDUCATION/TRAINING PROGRAM

## 2025-01-09 PROCEDURE — 3008F BODY MASS INDEX DOCD: CPT | Mod: CPTII,S$GLB,, | Performed by: STUDENT IN AN ORGANIZED HEALTH CARE EDUCATION/TRAINING PROGRAM

## 2025-01-09 PROCEDURE — 3074F SYST BP LT 130 MM HG: CPT | Mod: CPTII,S$GLB,, | Performed by: STUDENT IN AN ORGANIZED HEALTH CARE EDUCATION/TRAINING PROGRAM

## 2025-01-09 NOTE — PROGRESS NOTES
Subjective:    The following note was written in combination with deep-scribe software and dictation (to which understanding and consent was verbally expressed); please excuse any transcription and/or dictation errors.      Chief Complaint: Establish Care    HPI  Ms. Magnolia Shen is a 29 y.o. woman with Graves disease (please swanson diagnosis; seemingly lost to six-month endocrinology follow-up) presenting as a new patient to establish primary care/for annual physical.      Graves disease:  - diagnosed on the basis of stereotypical TSH suppression, T4 excess, and thyroid receptor antibodies  -currently treated with low-dose methimazole  - notably, she was within 1 year postpartum at time of diagnosis    Family, social, surgical Hx reviewed     Health Maintenance:  Due for annual screening labs (particularly thyroid panel) and routine vaccinations    No past medical history on file.  No past surgical history on file.  Family History   Problem Relation Name Age of Onset    Breast cancer Neg Hx      Colon cancer Neg Hx      Ovarian cancer Neg Hx      Melanoma Neg Hx       Social History     Socioeconomic History    Marital status:    Tobacco Use    Smoking status: Never    Smokeless tobacco: Never   Substance and Sexual Activity    Alcohol use: Yes     Alcohol/week: 1.0 standard drink of alcohol     Types: 1 Glasses of wine per week    Drug use: Never    Sexual activity: Yes     Partners: Male     Birth control/protection: OCP     Social Drivers of Health     Financial Resource Strain: Low Risk  (5/14/2024)    Overall Financial Resource Strain (CARDIA)     Difficulty of Paying Living Expenses: Not hard at all   Food Insecurity: No Food Insecurity (5/14/2024)    Hunger Vital Sign     Worried About Running Out of Food in the Last Year: Never true     Ran Out of Food in the Last Year: Never true   Transportation Needs: No Transportation Needs (5/14/2024)    PRAPARE - Transportation     Lack of Transportation  (Medical): No     Lack of Transportation (Non-Medical): No   Physical Activity: Sufficiently Active (5/14/2024)    Exercise Vital Sign     Days of Exercise per Week: 7 days     Minutes of Exercise per Session: 60 min   Stress: No Stress Concern Present (5/14/2024)    [employer] - Occupational Stress Questionnaire     Feeling of Stress : Not at all   Housing Stability: Unknown (5/14/2024)    Housing Stability Vital Sign     Unable to Pay for Housing in the Last Year: No     Review of patient's allergies indicates:  No Known Allergies  Magnolia Shen had no medications administered during this visit.   Review of Systems   Constitutional:  Negative for appetite change, chills and fever.   HENT: Negative.     Respiratory:  Negative for cough, chest tightness and shortness of breath.    Cardiovascular:  Negative for chest pain, palpitations and leg swelling.   Gastrointestinal:  Negative for abdominal distention, abdominal pain, blood in stool, constipation, diarrhea, nausea and vomiting.   Endocrine: Negative.    Genitourinary:  Negative for difficulty urinating, dysuria, frequency and hematuria.   Musculoskeletal: Negative.    Integumentary:  Negative.   Neurological: Negative.    Psychiatric/Behavioral: Negative.           Objective:      Vitals:    01/09/25 1250   BP: 114/68   Pulse: (!) 55   Resp: 16   Temp: 97.5 °F (36.4 °C)      Physical Exam  Vitals reviewed.   Constitutional:       General: She is not in acute distress.     Appearance: Normal appearance.   HENT:      Head: Normocephalic and atraumatic.      Comments: Facial features are symmetric      Nose: Nose normal. No congestion or rhinorrhea.      Mouth/Throat:      Mouth: Mucous membranes are moist.      Pharynx: Oropharynx is clear. No oropharyngeal exudate or posterior oropharyngeal erythema.   Eyes:      General: No scleral icterus.     Extraocular Movements: Extraocular movements intact.      Conjunctiva/sclera:  Conjunctivae normal.   Cardiovascular:      Rate and Rhythm: Normal rate and regular rhythm.      Pulses: Normal pulses.      Heart sounds: Normal heart sounds.   Pulmonary:      Effort: Pulmonary effort is normal. No respiratory distress.      Breath sounds: Normal breath sounds.   Musculoskeletal:         General: No deformity or signs of injury. Normal range of motion.      Cervical back: Normal range of motion.   Skin:     General: Skin is warm and dry.      Findings: No rash.   Neurological:      General: No focal deficit present.      Mental Status: She is alert and oriented to person, place, and time. Mental status is at baseline.   Psychiatric:         Mood and Affect: Mood normal.         Behavior: Behavior normal.         Thought Content: Thought content normal.       Current Outpatient Medications on File Prior to Visit   Medication Sig Dispense Refill    methIMAzole (TAPAZOLE) 5 MG Tab Take 1 tablet (5 mg total) by mouth once daily. 90 tablet 3     No current facility-administered medications on file prior to visit.         Assessment:       1. Physical exam, annual    2. Postpartum thyroiditis        Plan:       Assessment & Plan    GRAVES' DISEASE:  - Explained thyroid function and hormone feedback loop using drawn cartoon analogy/diaphragm.  - Discussed differences between Graves' disease and Hashimoto's thyroiditis.  - Educated the patient on potential symptoms of hypothyroidism if thyroid burns out.  - Continued methimazole 2.5 mg daily for now.  - Ordered thyroid function tests and thyroid antibody tests for both Graves' disease and Hashimoto's thyroiditis.  - Noted that the patient's TSH was previously undetectable (<0.0010) but has become detectable in the last 6 months.  - Observed that the patient reports no current symptoms but experiences fatigue, constipation, and weight gain when taking 5 mg of methimazole daily.  - Reviewed the patient's history of Graves' disease diagnosed after  pregnancy.    POSTPARTUM THYROIDITIS:  - Discussed the possibility of postpartum thyroiditis instead of Graves' disease given timeline of diagnosis and relation to pregnancy.  - Ordered thyroid antibody tests to differentiate between Graves' disease and postpartum thyroiditis.    THYROID DISORDER:  - Will continue monitoring thyroid function and antibody levels.    WEIGHT MANAGEMENT:  - Discussed risks of low body weight, including potential for decreased bone density.    LABS:  - Advised the patient to contact the office to schedule fasting labs before 8:30 AM.    FOLLOW UP:  - Instructed the patient to follow up after lab results are available.

## 2025-01-13 ENCOUNTER — LAB VISIT (OUTPATIENT)
Dept: LAB | Facility: HOSPITAL | Age: 30
End: 2025-01-13
Attending: STUDENT IN AN ORGANIZED HEALTH CARE EDUCATION/TRAINING PROGRAM
Payer: COMMERCIAL

## 2025-01-13 DIAGNOSIS — Z00.00 PHYSICAL EXAM, ANNUAL: ICD-10-CM

## 2025-01-13 LAB
25(OH)D3+25(OH)D2 SERPL-MCNC: 38 NG/ML (ref 30–96)
ALBUMIN SERPL BCP-MCNC: 4.3 G/DL (ref 3.5–5.2)
ALP SERPL-CCNC: 75 U/L (ref 40–150)
ALT SERPL W/O P-5'-P-CCNC: 13 U/L (ref 10–44)
ANION GAP SERPL CALC-SCNC: 9 MMOL/L (ref 8–16)
AST SERPL-CCNC: 20 U/L (ref 10–40)
BASOPHILS # BLD AUTO: 0.04 K/UL (ref 0–0.2)
BASOPHILS NFR BLD: 0.9 % (ref 0–1.9)
BILIRUB SERPL-MCNC: 0.7 MG/DL (ref 0.1–1)
BUN SERPL-MCNC: 18 MG/DL (ref 6–20)
CALCIUM SERPL-MCNC: 9.4 MG/DL (ref 8.7–10.5)
CHLORIDE SERPL-SCNC: 107 MMOL/L (ref 95–110)
CHOLEST SERPL-MCNC: 177 MG/DL (ref 120–199)
CHOLEST/HDLC SERPL: 3.3 {RATIO} (ref 2–5)
CO2 SERPL-SCNC: 23 MMOL/L (ref 23–29)
CREAT SERPL-MCNC: 0.8 MG/DL (ref 0.5–1.4)
DIFFERENTIAL METHOD BLD: ABNORMAL
EOSINOPHIL # BLD AUTO: 0.1 K/UL (ref 0–0.5)
EOSINOPHIL NFR BLD: 2.4 % (ref 0–8)
ERYTHROCYTE [DISTWIDTH] IN BLOOD BY AUTOMATED COUNT: 12.6 % (ref 11.5–14.5)
EST. GFR  (NO RACE VARIABLE): >60 ML/MIN/1.73 M^2
ESTIMATED AVG GLUCOSE: 88 MG/DL (ref 68–131)
GLUCOSE SERPL-MCNC: 86 MG/DL (ref 70–110)
HBA1C MFR BLD: 4.7 % (ref 4–5.6)
HCT VFR BLD AUTO: 45.2 % (ref 37–48.5)
HDLC SERPL-MCNC: 53 MG/DL (ref 40–75)
HDLC SERPL: 29.9 % (ref 20–50)
HGB BLD-MCNC: 14.7 G/DL (ref 12–16)
HIV 1+2 AB+HIV1 P24 AG SERPL QL IA: NORMAL
IMM GRANULOCYTES # BLD AUTO: 0.01 K/UL (ref 0–0.04)
IMM GRANULOCYTES NFR BLD AUTO: 0.2 % (ref 0–0.5)
LDLC SERPL CALC-MCNC: 112.8 MG/DL (ref 63–159)
LYMPHOCYTES # BLD AUTO: 2.4 K/UL (ref 1–4.8)
LYMPHOCYTES NFR BLD: 52.2 % (ref 18–48)
MCH RBC QN AUTO: 30.2 PG (ref 27–31)
MCHC RBC AUTO-ENTMCNC: 32.5 G/DL (ref 32–36)
MCV RBC AUTO: 93 FL (ref 82–98)
MONOCYTES # BLD AUTO: 0.4 K/UL (ref 0.3–1)
MONOCYTES NFR BLD: 8.7 % (ref 4–15)
NEUTROPHILS # BLD AUTO: 1.6 K/UL (ref 1.8–7.7)
NEUTROPHILS NFR BLD: 35.6 % (ref 38–73)
NONHDLC SERPL-MCNC: 124 MG/DL
NRBC BLD-RTO: 0 /100 WBC
PLATELET # BLD AUTO: 226 K/UL (ref 150–450)
PMV BLD AUTO: 10 FL (ref 9.2–12.9)
POTASSIUM SERPL-SCNC: 4.2 MMOL/L (ref 3.5–5.1)
PROT SERPL-MCNC: 7.5 G/DL (ref 6–8.4)
RBC # BLD AUTO: 4.87 M/UL (ref 4–5.4)
SODIUM SERPL-SCNC: 139 MMOL/L (ref 136–145)
T4 FREE SERPL-MCNC: 1.06 NG/DL (ref 0.71–1.51)
THYROPEROXIDASE IGG SERPL-ACNC: 3526.6 IU/ML
TRIGL SERPL-MCNC: 56 MG/DL (ref 30–150)
TSH SERPL DL<=0.005 MIU/L-ACNC: 0.03 UIU/ML (ref 0.4–4)
WBC # BLD AUTO: 4.6 K/UL (ref 3.9–12.7)

## 2025-01-13 PROCEDURE — 86376 MICROSOMAL ANTIBODY EACH: CPT | Performed by: STUDENT IN AN ORGANIZED HEALTH CARE EDUCATION/TRAINING PROGRAM

## 2025-01-13 PROCEDURE — 80053 COMPREHEN METABOLIC PANEL: CPT | Performed by: STUDENT IN AN ORGANIZED HEALTH CARE EDUCATION/TRAINING PROGRAM

## 2025-01-13 PROCEDURE — 87389 HIV-1 AG W/HIV-1&-2 AB AG IA: CPT | Performed by: STUDENT IN AN ORGANIZED HEALTH CARE EDUCATION/TRAINING PROGRAM

## 2025-01-13 PROCEDURE — 83036 HEMOGLOBIN GLYCOSYLATED A1C: CPT | Performed by: STUDENT IN AN ORGANIZED HEALTH CARE EDUCATION/TRAINING PROGRAM

## 2025-01-13 PROCEDURE — 84443 ASSAY THYROID STIM HORMONE: CPT | Performed by: STUDENT IN AN ORGANIZED HEALTH CARE EDUCATION/TRAINING PROGRAM

## 2025-01-13 PROCEDURE — 85025 COMPLETE CBC W/AUTO DIFF WBC: CPT | Performed by: STUDENT IN AN ORGANIZED HEALTH CARE EDUCATION/TRAINING PROGRAM

## 2025-01-13 PROCEDURE — 84439 ASSAY OF FREE THYROXINE: CPT | Performed by: STUDENT IN AN ORGANIZED HEALTH CARE EDUCATION/TRAINING PROGRAM

## 2025-01-13 PROCEDURE — 82306 VITAMIN D 25 HYDROXY: CPT | Performed by: STUDENT IN AN ORGANIZED HEALTH CARE EDUCATION/TRAINING PROGRAM

## 2025-01-13 PROCEDURE — 83520 IMMUNOASSAY QUANT NOS NONAB: CPT | Performed by: STUDENT IN AN ORGANIZED HEALTH CARE EDUCATION/TRAINING PROGRAM

## 2025-01-13 PROCEDURE — 80061 LIPID PANEL: CPT | Performed by: STUDENT IN AN ORGANIZED HEALTH CARE EDUCATION/TRAINING PROGRAM

## 2025-01-14 LAB — TSH RECEP AB SER-ACNC: 29 IU/L (ref 0–1.75)

## 2025-01-16 ENCOUNTER — PATIENT MESSAGE (OUTPATIENT)
Dept: ENDOCRINOLOGY | Facility: CLINIC | Age: 30
End: 2025-01-16
Payer: COMMERCIAL

## 2025-01-16 DIAGNOSIS — E05.00 GRAVES DISEASE: Primary | ICD-10-CM

## 2025-03-12 ENCOUNTER — LAB VISIT (OUTPATIENT)
Dept: LAB | Facility: HOSPITAL | Age: 30
End: 2025-03-12
Attending: INTERNAL MEDICINE
Payer: COMMERCIAL

## 2025-03-12 DIAGNOSIS — E05.00 GRAVES DISEASE: ICD-10-CM

## 2025-03-12 LAB — TSH SERPL DL<=0.005 MIU/L-ACNC: 0.69 UIU/ML (ref 0.4–4)

## 2025-03-12 PROCEDURE — 84443 ASSAY THYROID STIM HORMONE: CPT | Performed by: INTERNAL MEDICINE

## 2025-03-12 PROCEDURE — 36415 COLL VENOUS BLD VENIPUNCTURE: CPT | Mod: PO | Performed by: INTERNAL MEDICINE

## 2025-03-13 ENCOUNTER — RESULTS FOLLOW-UP (OUTPATIENT)
Dept: ENDOCRINOLOGY | Facility: CLINIC | Age: 30
End: 2025-03-13

## 2025-03-14 ENCOUNTER — OFFICE VISIT (OUTPATIENT)
Dept: ENDOCRINOLOGY | Facility: CLINIC | Age: 30
End: 2025-03-14
Payer: COMMERCIAL

## 2025-03-14 VITALS — BODY MASS INDEX: 20.56 KG/M2 | WEIGHT: 131 LBS | HEIGHT: 67 IN

## 2025-03-14 DIAGNOSIS — E06.3 AUTOIMMUNE THYROIDITIS: Primary | ICD-10-CM

## 2025-03-14 DIAGNOSIS — E05.00 GRAVES DISEASE: ICD-10-CM

## 2025-03-14 DIAGNOSIS — E05.90 HYPERTHYROIDISM: ICD-10-CM

## 2025-03-14 RX ORDER — METHIMAZOLE 5 MG/1
5 TABLET ORAL DAILY
Qty: 90 TABLET | Refills: 3 | Status: SHIPPED | OUTPATIENT
Start: 2025-03-14 | End: 2026-03-14

## 2025-03-14 NOTE — PROGRESS NOTES
The patient location is: [place]  The chief complaint leading to consultation is:   Chief Complaint   Patient presents with    Hyperthyroidism       Visit type: audiovisual    Face to Face time with patient: 16 minutes  22 minutes of total time spent on the encounter, which includes face to face time and non-face to face time preparing to see the patient (eg, review of tests), Obtaining and/or reviewing separately obtained history, Documenting clinical information in the electronic or other health record, Independently interpreting results (not separately reported) and communicating results to the patient/family/caregiver, or Care coordination (not separately reported).    Each patient to whom he or she provides medical services by telemedicine is:  (1) informed of the relationship between the physician and patient and the respective role of any other health care provider with respect to management of the patient; and (2) notified that he or she may decline to receive medical services by telemedicine and may withdraw from such care at any time.      Magnolia Shen is a 29 y.o. female who presents for follow up of Graves' disease    History of Present Illness    CHIEF COMPLAINT:  Patient presents today for follow-up of Graves' disease    THYROID MANAGEMENT:  She alternates between half tablets (2.5 mg )and one full tablet (5 mg) of methimazole every third day.   Feels good on this dose    CARDIAC SYMPTOMS:  She reports occasional sensation of heart racing, though when measured, heart rate is normal at 70. She experiences a subjective feeling of cardiac abnormality that she suspects is linked to her menstrual cycle. Her resting heart rate ranges from 39-40 BPM.    LIFESTYLE:  She exercises daily and maintains a health-conscious lifestyle with meticulous attention to diet.    ROS:  General: -weight loss  Cardiovascular: -chest pain, +palpitations, +feelings of fast heart rate, +feelings of slow heart rate  Respiratory:  "-shortness of breath  Musculoskeletal: -joint pain           Dx with Graves' disease on 12/2022 due to symptoms of palpitations (SVT), heat intolerance. She denies weight loss.   TRAB/ TSI ++     Pt was started on methimazole 5 mg daily on 12/2022. Her dose was decreased to 2.5 mg daily in 3/2023 as she reported symptoms of fatigue, constipation and weight gain on the 5 mg dose.        Eye disease:  No evidence of active disease   CBC and LFT: WNL in 3/2023  Family history of Thyroid disease: Grandmother and Aunt have hypothyroidism.        Thyroid US: 12/2022:   FINDINGS:  The thyroid is normal in size.  Right lobe of the thyroid measures 4.4 x 1.7 x 2.0 cm.  Left lobe of the thyroid measures 4.4 x 1.8 x 1.5 cm.  AP isthmus is 0.2 cm in thickness.  Diffuse hypervascular thyroid parenchyma.  0.5 x 0.3 x 0.3 cm left midpole hypoechoic solid nodule (TR 4).  No nodules meeting criteria for follow-up or FNA.     No cervical lymphadenopathy.     Impression:  Hypervascular thyroid parenchyma which can be seen with sequela of thyroiditis.     Patient has twins.   No plans any pregnancy,  vasectomy  Denies neck pain or tenderness.     GM and aunt with hypothyroidism    Lab Results   Component Value Date    TSH 0.686 03/12/2025         Objective:     Ht 5' 7" (1.702 m)   Wt 59.4 kg (131 lb)   LMP  (LMP Unknown)   BMI 20.52 kg/m²     Body mass index is 20.52 kg/m².          Lab Review:   Lab Results   Component Value Date    HGBA1C 4.7 01/13/2025     Lab Results   Component Value Date    CHOL 177 01/13/2025    HDL 53 01/13/2025    LDLCALC 112.8 01/13/2025    TRIG 56 01/13/2025    CHOLHDL 29.9 01/13/2025     Lab Results   Component Value Date     01/13/2025    K 4.2 01/13/2025     01/13/2025    CO2 23 01/13/2025    GLU 86 01/13/2025    BUN 18 01/13/2025    CREATININE 0.8 01/13/2025    CALCIUM 9.4 01/13/2025    PROT 7.5 01/13/2025    ALBUMIN 4.3 01/13/2025    BILITOT 0.7 01/13/2025    ALKPHOS 75 01/13/2025 "    AST 20 01/13/2025    ALT 13 01/13/2025    ANIONGAP 9 01/13/2025    TSH 0.686 03/12/2025     Vit D, 25-Hydroxy   Date Value Ref Range Status   01/13/2025 38 30 - 96 ng/mL Final     Comment:     Vitamin D deficiency.........<10 ng/mL                              Vitamin D insufficiency......10-29 ng/mL       Vitamin D sufficiency........> or equal to 30 ng/mL  Vitamin D toxicity............>100 ng/mL         Assessment and Plan     1. Autoimmune thyroiditis  Misc Sendout Test, Blood Diabetes type 1 autoimmune panel CODE 34820    Misc Sendout Test, Blood Diabetes type 1 autoimmune panel CODE 99361          Assessment & Plan    IMPRESSION:  - Assessed thyroid function based on recent TSH levels, currently at the lower end of normal range.  - Determined current methimazole dosing regimen (2.5 mg for 2 days, 5 mg on 3rd day) is effective in maintaining normal thyroid function.  - Evaluated reported intermittent palpitations, concluding they are likely unrelated to thyroid function given normal TSH levels and lack of consistent symptoms typical of hyperthyroidism.  - Considered history of both Graves' disease and Hashimoto's antibodies, noting potential for future transition between hyper- and hypothyroidism.  - Recommend screening for type 1 diabetes antibodies due to increased risk in patients with autoimmune thyroid disease and availability of preventive treatments.  - Assessed need for thyroid US follow-up, determining it unnecessary based on previous unremarkable findings.    GRAVES'  Continued methimazole 5 mg daily, with patient instructions to maintain current regimen of 2.5 mg for 2 days, followed by 5 mg on the 3rd day. Ordered TSH test in 6 months. Contact office if experiencing new symptoms such as unexplained weight loss, consistent palpitations, elevated heart rate, or tremors before next scheduled follow-up.    AUTOIMMUNE THYROIDITIS:  - Discussed the limited evidence for selenium supplementation in  potentially lowering thyroid antibody levels and reducing risk of Graves' eye disease. Started selenium 100 mcg twice daily.    AUTOIMMUNE DIABETES SCREENING:  - Ordered panel of 4 antibodies associated with autoimmune diabetes through [facility]. Call [facility] before going for antibody panel to ensure they can see the order.    FOLLOW-UP:  - Follow up in 6 months for TSH test. Follow up in 1 year if 6-month lab results are normal.             Julia Valdovinos MD      RTC 6 months    Visit today included increased complexity associated with the care of the problems addressed and managing the longitudinal care of the patient due to the serious and/or complex managed problems    This note was generated with the assistance of ambient listening technology. Verbal consent was obtained by the patient and accompanying visitor(s) for the recording of patient appointment to facilitate this note. I attest to having reviewed and edited the generated note for accuracy, though some syntax or spelling errors may persist. Please contact the author of this note for any clarification.

## 2025-04-02 ENCOUNTER — PATIENT MESSAGE (OUTPATIENT)
Dept: ENDOCRINOLOGY | Facility: CLINIC | Age: 30
End: 2025-04-02
Payer: COMMERCIAL

## 2025-04-23 ENCOUNTER — PATIENT MESSAGE (OUTPATIENT)
Dept: ENDOCRINOLOGY | Facility: CLINIC | Age: 30
End: 2025-04-23
Payer: COMMERCIAL

## 2025-05-14 ENCOUNTER — PATIENT MESSAGE (OUTPATIENT)
Dept: ENDOCRINOLOGY | Facility: CLINIC | Age: 30
End: 2025-05-14
Payer: COMMERCIAL

## 2025-05-14 DIAGNOSIS — E05.00 GRAVES DISEASE: Primary | ICD-10-CM

## 2025-05-15 ENCOUNTER — LAB VISIT (OUTPATIENT)
Dept: LAB | Facility: HOSPITAL | Age: 30
End: 2025-05-15
Payer: COMMERCIAL

## 2025-05-15 DIAGNOSIS — E05.00 GRAVES DISEASE: ICD-10-CM

## 2025-05-15 DIAGNOSIS — E05.90 HYPERTHYROIDISM: ICD-10-CM

## 2025-05-15 LAB — TSH SERPL-ACNC: 2.79 UIU/ML (ref 0.4–4)

## 2025-05-15 PROCEDURE — 84443 ASSAY THYROID STIM HORMONE: CPT

## 2025-05-15 PROCEDURE — 36415 COLL VENOUS BLD VENIPUNCTURE: CPT | Mod: PO

## 2025-05-16 NOTE — TELEPHONE ENCOUNTER
Please advise. Not sure about ordering these other tests pt is asking for. Didn't think this was urgent so not sending to on-call

## 2025-06-17 ENCOUNTER — LAB VISIT (OUTPATIENT)
Dept: LAB | Facility: HOSPITAL | Age: 30
End: 2025-06-17
Attending: INTERNAL MEDICINE
Payer: COMMERCIAL

## 2025-06-17 DIAGNOSIS — E05.00 GRAVES DISEASE: ICD-10-CM

## 2025-06-17 LAB — TSH SERPL-ACNC: 0.51 UIU/ML (ref 0.4–4)

## 2025-06-17 PROCEDURE — 36415 COLL VENOUS BLD VENIPUNCTURE: CPT | Mod: PO

## 2025-06-17 PROCEDURE — 84443 ASSAY THYROID STIM HORMONE: CPT

## 2025-06-18 ENCOUNTER — RESULTS FOLLOW-UP (OUTPATIENT)
Dept: ENDOCRINOLOGY | Facility: CLINIC | Age: 30
End: 2025-06-18

## 2025-08-25 ENCOUNTER — HOSPITAL ENCOUNTER (OUTPATIENT)
Dept: RADIOLOGY | Facility: HOSPITAL | Age: 30
Discharge: HOME | End: 2025-08-25
Attending: NURSE PRACTITIONER
Payer: COMMERCIAL

## 2025-08-25 DIAGNOSIS — R10.2 PELVIC PAIN: ICD-10-CM

## 2025-08-25 PROCEDURE — 76856 US EXAM PELVIC COMPLETE: CPT | Mod: TC

## 2025-08-25 PROCEDURE — 76830 TRANSVAGINAL US NON-OB: CPT | Mod: 26,,, | Performed by: RADIOLOGY

## 2025-08-25 PROCEDURE — 76856 US EXAM PELVIC COMPLETE: CPT | Mod: 26,,, | Performed by: RADIOLOGY

## 2025-08-26 ENCOUNTER — PATIENT MESSAGE (OUTPATIENT)
Dept: OBSTETRICS AND GYNECOLOGY | Facility: CLINIC | Age: 30
End: 2025-08-26
Payer: COMMERCIAL